# Patient Record
Sex: FEMALE | Race: ASIAN | Employment: FULL TIME | URBAN - METROPOLITAN AREA
[De-identification: names, ages, dates, MRNs, and addresses within clinical notes are randomized per-mention and may not be internally consistent; named-entity substitution may affect disease eponyms.]

---

## 2017-01-29 PROBLEM — M25.561 RIGHT KNEE PAIN, UNSPECIFIED CHRONICITY: Status: ACTIVE | Noted: 2017-01-29

## 2017-01-29 PROBLEM — M77.11 EPICONDYLITIS, LATERAL, RIGHT: Status: ACTIVE | Noted: 2017-01-29

## 2017-02-10 ENCOUNTER — HOSPITAL ENCOUNTER (EMERGENCY)
Age: 57
Discharge: HOME | End: 2017-02-10
Attending: EMERGENCY MEDICINE
Payer: COMMERCIAL

## 2017-02-10 ENCOUNTER — APPOINTMENT (OUTPATIENT)
Dept: GENERAL RADIOLOGY | Age: 57
End: 2017-02-10
Attending: EMERGENCY MEDICINE
Payer: COMMERCIAL

## 2017-02-10 VITALS
HEART RATE: 109 BPM | RESPIRATION RATE: 20 BRPM | WEIGHT: 135 LBS | BODY MASS INDEX: 25.49 KG/M2 | HEIGHT: 61 IN | SYSTOLIC BLOOD PRESSURE: 138 MMHG | DIASTOLIC BLOOD PRESSURE: 80 MMHG | OXYGEN SATURATION: 100 % | TEMPERATURE: 101 F

## 2017-02-10 DIAGNOSIS — J06.9 VIRAL URI WITH COUGH: Primary | ICD-10-CM

## 2017-02-10 LAB
ALBUMIN SERPL-MCNC: 3.8 G/DL (ref 3.5–4.8)
ALP LIVER SERPL-CCNC: 77 U/L (ref 46–118)
ALT SERPL-CCNC: 37 U/L (ref 14–54)
AST SERPL-CCNC: 24 U/L (ref 15–41)
BASOPHILS # BLD AUTO: 0.03 X10(3) UL (ref 0–0.1)
BASOPHILS NFR BLD AUTO: 0.4 %
BILIRUB SERPL-MCNC: 0.5 MG/DL (ref 0.1–2)
BUN BLD-MCNC: 11 MG/DL (ref 8–20)
CALCIUM BLD-MCNC: 8.5 MG/DL (ref 8.3–10.3)
CHLORIDE: 101 MMOL/L (ref 101–111)
CO2: 26 MMOL/L (ref 22–32)
CREAT BLD-MCNC: 0.59 MG/DL (ref 0.55–1.02)
EOSINOPHIL # BLD AUTO: 0 X10(3) UL (ref 0–0.3)
EOSINOPHIL NFR BLD AUTO: 0 %
ERYTHROCYTE [DISTWIDTH] IN BLOOD BY AUTOMATED COUNT: 15 % (ref 11.5–16)
GLUCOSE BLD-MCNC: 109 MG/DL (ref 70–99)
HCT VFR BLD AUTO: 34.5 % (ref 34–50)
HGB BLD-MCNC: 11.5 G/DL (ref 12–16)
IMMATURE GRANULOCYTE COUNT: 0.03 X10(3) UL (ref 0–1)
IMMATURE GRANULOCYTE RATIO %: 0.4 %
LYMPHOCYTES # BLD AUTO: 0.63 X10(3) UL (ref 0.9–4)
LYMPHOCYTES NFR BLD AUTO: 8.5 %
M PROTEIN MFR SERPL ELPH: 7.5 G/DL (ref 6.1–8.3)
MCH RBC QN AUTO: 27.4 PG (ref 27–33.2)
MCHC RBC AUTO-ENTMCNC: 33.3 G/DL (ref 31–37)
MCV RBC AUTO: 82.1 FL (ref 81–100)
MONOCYTES # BLD AUTO: 0.53 X10(3) UL (ref 0.1–0.6)
MONOCYTES NFR BLD AUTO: 7.2 %
NEUTROPHIL ABS PRELIM: 6.19 X10 (3) UL (ref 1.3–6.7)
NEUTROPHILS # BLD AUTO: 6.19 X10(3) UL (ref 1.3–6.7)
NEUTROPHILS NFR BLD AUTO: 83.5 %
PLATELET # BLD AUTO: 252 10(3)UL (ref 150–450)
POTASSIUM SERPL-SCNC: 3.8 MMOL/L (ref 3.6–5.1)
RBC # BLD AUTO: 4.2 X10(6)UL (ref 3.8–5.1)
RED CELL DISTRIBUTION WIDTH-SD: 45.2 FL (ref 35.1–46.3)
SODIUM SERPL-SCNC: 138 MMOL/L (ref 136–144)
WBC # BLD AUTO: 7.4 X10(3) UL (ref 4–13)

## 2017-02-10 PROCEDURE — 99284 EMERGENCY DEPT VISIT MOD MDM: CPT

## 2017-02-10 PROCEDURE — 99283 EMERGENCY DEPT VISIT LOW MDM: CPT

## 2017-02-10 PROCEDURE — 80053 COMPREHEN METABOLIC PANEL: CPT | Performed by: EMERGENCY MEDICINE

## 2017-02-10 PROCEDURE — 71020 XR CHEST PA + LAT CHEST (CPT=71020): CPT

## 2017-02-10 PROCEDURE — 36415 COLL VENOUS BLD VENIPUNCTURE: CPT

## 2017-02-10 PROCEDURE — 85025 COMPLETE CBC W/AUTO DIFF WBC: CPT | Performed by: EMERGENCY MEDICINE

## 2017-02-10 RX ORDER — ACETAMINOPHEN 500 MG
1000 TABLET ORAL ONCE
Status: COMPLETED | OUTPATIENT
Start: 2017-02-10 | End: 2017-02-10

## 2017-02-10 RX ORDER — SODIUM CHLORIDE 9 MG/ML
INJECTION, SOLUTION INTRAVENOUS ONCE
Status: COMPLETED | OUTPATIENT
Start: 2017-02-10 | End: 2017-02-10

## 2017-02-10 NOTE — ED PROVIDER NOTES
Patient Seen in: [facility] Emergency Department In [place]    History   Patient presents with:  Cough/URI  Fever Sepsis (infectious)    Stated Complaint: COUGH    HPI    Patient is a 59-year-old female who presents emergency room with a history of cough daily. Fluticasone-Salmeterol (ADVAIR DISKUS IN),  Inhale 1-2 puffs into the lungs as needed. Albuterol Sulfate HFA (PROAIR HFA) 108 (90 BASE) MCG/ACT Inhalation Aero Soln,  Inhale 1 puff into the lungs every 6 (six) hours as needed for Wheezing. There is normoactive bowel sounds. There is no hernia. EXTREMITIES: There is no cyanosis, clubbing, or edema appreciated. Pulses are 2+ and equal in all 4 extremities. NEURO: Patient is awake, alert and oriented to time place and person.  Motor strength i home as well as Motrin and Tylenol for any fever at home. Instructions to have the patient return to the ER immediately if symptoms worsen or if any other problems arise. Patient discharged home at this time.         Disposition and Plan     Clinical Impr

## 2017-02-20 ENCOUNTER — HOSPITAL ENCOUNTER (OUTPATIENT)
Dept: GENERAL RADIOLOGY | Facility: HOSPITAL | Age: 57
Discharge: HOME | End: 2017-02-20
Attending: INTERNAL MEDICINE
Payer: COMMERCIAL

## 2017-02-20 DIAGNOSIS — J40 BRONCHITIS: ICD-10-CM

## 2017-02-20 PROCEDURE — 71020 XR CHEST PA + LAT CHEST (CPT=71020): CPT

## 2017-03-18 PROBLEM — M71.21 BAKER'S CYST, RIGHT: Status: ACTIVE | Noted: 2017-03-18

## 2017-06-12 PROBLEM — M25.571 RIGHT ANKLE PAIN, UNSPECIFIED CHRONICITY: Status: ACTIVE | Noted: 2017-06-12

## 2017-06-12 PROBLEM — M19.171 POST-TRAUMATIC ARTHRITIS OF RIGHT ANKLE: Status: ACTIVE | Noted: 2017-06-12

## 2017-11-15 PROBLEM — M19.071 PRIMARY OSTEOARTHRITIS OF RIGHT ANKLE: Status: ACTIVE | Noted: 2017-11-15

## 2017-11-29 ENCOUNTER — TELEPHONE (OUTPATIENT)
Dept: OBGYN CLINIC | Facility: CLINIC | Age: 57
End: 2017-11-29

## 2017-11-29 NOTE — TELEPHONE ENCOUNTER
Received from [address] results in Dr Gilbert bustamante in [place]     Pt has upcoming Annual with Dr Julianna Canseco in [place] on 12/8

## 2017-12-05 ENCOUNTER — MED REC SCAN ONLY (OUTPATIENT)
Dept: OBGYN CLINIC | Facility: CLINIC | Age: 57
End: 2017-12-05

## 2017-12-29 ENCOUNTER — PRIOR ORIGINAL RECORDS (OUTPATIENT)
Dept: OTHER | Age: 57
End: 2017-12-29

## 2018-01-11 ENCOUNTER — APPOINTMENT (OUTPATIENT)
Dept: GENERAL RADIOLOGY | Facility: HOSPITAL | Age: 58
End: 2018-01-11
Attending: EMERGENCY MEDICINE
Payer: COMMERCIAL

## 2018-01-11 ENCOUNTER — HOSPITAL ENCOUNTER (EMERGENCY)
Facility: HOSPITAL | Age: 58
Discharge: HOME | End: 2018-01-11
Attending: EMERGENCY MEDICINE
Payer: COMMERCIAL

## 2018-01-11 VITALS
RESPIRATION RATE: 16 BRPM | SYSTOLIC BLOOD PRESSURE: 157 MMHG | HEART RATE: 91 BPM | DIASTOLIC BLOOD PRESSURE: 87 MMHG | TEMPERATURE: 98 F | OXYGEN SATURATION: 100 %

## 2018-01-11 DIAGNOSIS — S16.1XXA STRAIN OF NECK MUSCLE, INITIAL ENCOUNTER: Primary | ICD-10-CM

## 2018-01-11 DIAGNOSIS — S39.012A STRAIN OF LUMBAR REGION, INITIAL ENCOUNTER: ICD-10-CM

## 2018-01-11 DIAGNOSIS — S40.011A CONTUSION OF RIGHT SHOULDER, INITIAL ENCOUNTER: ICD-10-CM

## 2018-01-11 PROCEDURE — 72040 X-RAY EXAM NECK SPINE 2-3 VW: CPT | Performed by: EMERGENCY MEDICINE

## 2018-01-11 PROCEDURE — 71045 X-RAY EXAM CHEST 1 VIEW: CPT | Performed by: EMERGENCY MEDICINE

## 2018-01-11 PROCEDURE — 99284 EMERGENCY DEPT VISIT MOD MDM: CPT

## 2018-01-11 PROCEDURE — 96374 THER/PROPH/DIAG INJ IV PUSH: CPT

## 2018-01-11 PROCEDURE — 73020 X-RAY EXAM OF SHOULDER: CPT | Performed by: EMERGENCY MEDICINE

## 2018-01-11 PROCEDURE — 72100 X-RAY EXAM L-S SPINE 2/3 VWS: CPT | Performed by: EMERGENCY MEDICINE

## 2018-01-11 RX ORDER — CYCLOBENZAPRINE HCL 10 MG
10 TABLET ORAL 3 TIMES DAILY PRN
Qty: 20 TABLET | Refills: 0 | Status: SHIPPED | OUTPATIENT
Start: 2018-01-11 | End: 2018-01-18

## 2018-01-11 RX ORDER — IBUPROFEN 600 MG/1
600 TABLET ORAL EVERY 8 HOURS PRN
Qty: 30 TABLET | Refills: 0 | Status: SHIPPED | OUTPATIENT
Start: 2018-01-11 | End: 2018-01-18

## 2018-01-11 RX ORDER — KETOROLAC TROMETHAMINE 30 MG/ML
30 INJECTION, SOLUTION INTRAMUSCULAR; INTRAVENOUS ONCE
Status: COMPLETED | OUTPATIENT
Start: 2018-01-11 | End: 2018-01-11
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
## 2018-04-18 ENCOUNTER — PRIOR ORIGINAL RECORDS (OUTPATIENT)
Dept: OTHER | Age: 58
End: 2018-04-18

## 2018-04-18 LAB
ALBUMIN: 4 G/DL
ALKALINE PHOSPHATATE(ALK PHOS): 70 IU/L
BILIRUBIN TOTAL: 0.4 MG/DL
BUN: 15 MG/DL
CALCIUM: 8.7 MG/DL
CHLORIDE: 105 MEQ/L
CHOLESTEROL, TOTAL: 244 MG/DL
CREATININE, SERUM: 0.47 MG/DL
GLUCOSE: 75 MG/DL
HDL CHOLESTEROL: 85 MG/DL
HEMATOCRIT: 35.8 %
HEMOGLOBIN A1C: 5.7 %
HEMOGLOBIN: 12 G/DL
LDL CHOLESTEROL: 137 MG/DL
PLATELETS: 287 K/UL
POTASSIUM, SERUM: 4.3 MEQ/L
PROTEIN, TOTAL: 6.7 G/DL
RED BLOOD COUNT: 4.44 X 10-6/U
SGOT (AST): 24 IU/L
SGPT (ALT): 18 IU/L
SODIUM: 142 MEQ/L
TRIGLYCERIDES: 109 MG/DL
WHITE BLOOD COUNT: 5.3 X 10-3/U

## 2018-05-04 ENCOUNTER — PRIOR ORIGINAL RECORDS (OUTPATIENT)
Dept: OTHER | Age: 58
End: 2018-05-04

## 2018-06-05 ENCOUNTER — HOSPITAL ENCOUNTER (EMERGENCY)
Age: 58
Discharge: HOME | End: 2018-06-05
Attending: EMERGENCY MEDICINE
Payer: COMMERCIAL

## 2018-06-05 ENCOUNTER — APPOINTMENT (OUTPATIENT)
Dept: GENERAL RADIOLOGY | Age: 58
End: 2018-06-05
Attending: EMERGENCY MEDICINE
Payer: COMMERCIAL

## 2018-06-05 VITALS
DIASTOLIC BLOOD PRESSURE: 76 MMHG | SYSTOLIC BLOOD PRESSURE: 134 MMHG | OXYGEN SATURATION: 99 % | TEMPERATURE: 97 F | HEART RATE: 71 BPM | WEIGHT: 155 LBS | BODY MASS INDEX: 29 KG/M2 | RESPIRATION RATE: 16 BRPM

## 2018-06-05 DIAGNOSIS — S39.012A BACK STRAIN, INITIAL ENCOUNTER: Primary | ICD-10-CM

## 2018-06-05 PROCEDURE — 72220 X-RAY EXAM SACRUM TAILBONE: CPT | Performed by: EMERGENCY MEDICINE

## 2018-06-05 PROCEDURE — 99284 EMERGENCY DEPT VISIT MOD MDM: CPT

## 2018-06-05 PROCEDURE — 72110 X-RAY EXAM L-2 SPINE 4/>VWS: CPT | Performed by: EMERGENCY MEDICINE

## 2018-06-06 NOTE — ED PROVIDER NOTES
Patient Seen in: [facility] Emergency Department In [place]    History   Patient presents with:  Fall (musculoskeletal, neurologic)    Stated Complaint: pain to right buttock after a fall from a step ladder.      HPI    60-year-old female who presents margarita [06/05/18 2143]  BP: 141/72  Pulse: 81  Resp: 16  Temp: 97.1 °F (36.2 °C)  Temp src: Oral  SpO2: 99 %  O2 Device: None (Room air)    Current:/76   Pulse 71   Temp 97.1 °F (36.2 °C) (Oral)   Resp 16   Wt 70.3 kg   SpO2 99%   BMI 29.29 kg/m²         Ph develop.             Disposition and Plan     Clinical Impression:  Back strain, initial encounter  (primary encounter diagnosis)    Disposition:  Discharge  6/5/2018 11:11 pm    Follow-up:  Shweta Hyman MD  [address] [place] [address]

## 2018-06-19 ENCOUNTER — HOSPITAL ENCOUNTER (OUTPATIENT)
Dept: CV DIAGNOSTICS | Facility: HOSPITAL | Age: 58
Discharge: HOME | End: 2018-06-19
Attending: INTERNAL MEDICINE
Payer: COMMERCIAL

## 2018-06-19 DIAGNOSIS — E78.00 PURE HYPERCHOLESTEROLEMIA: ICD-10-CM

## 2018-06-19 DIAGNOSIS — Z82.49 FAM HX-ISCHEM HEART DISEASE: ICD-10-CM

## 2018-06-19 DIAGNOSIS — R07.9 CHEST PAIN: ICD-10-CM

## 2018-06-19 DIAGNOSIS — I10 ESSENTIAL HYPERTENSION, MALIGNANT: ICD-10-CM

## 2018-06-19 PROCEDURE — 78452 HT MUSCLE IMAGE SPECT MULT: CPT | Performed by: INTERNAL MEDICINE

## 2018-06-19 PROCEDURE — 93017 CV STRESS TEST TRACING ONLY: CPT | Performed by: INTERNAL MEDICINE

## 2018-06-19 PROCEDURE — 93018 CV STRESS TEST I&R ONLY: CPT | Performed by: INTERNAL MEDICINE

## 2018-06-26 PROBLEM — G57.61 MORTON NEUROMA, RIGHT: Status: ACTIVE | Noted: 2018-06-26

## 2018-07-02 ENCOUNTER — PRIOR ORIGINAL RECORDS (OUTPATIENT)
Dept: OTHER | Age: 58
End: 2018-07-02

## 2018-08-28 ENCOUNTER — PRIOR ORIGINAL RECORDS (OUTPATIENT)
Dept: OTHER | Age: 58
End: 2018-08-28

## 2018-08-28 ENCOUNTER — LAB ENCOUNTER (OUTPATIENT)
Dept: LAB | Age: 58
End: 2018-08-28
Attending: INTERNAL MEDICINE
Payer: COMMERCIAL

## 2018-08-28 DIAGNOSIS — E78.00 HYPERCHOLESTEROLEMIA: ICD-10-CM

## 2018-08-28 DIAGNOSIS — E55.9 VITAMIN D DEFICIENCY: ICD-10-CM

## 2018-08-28 DIAGNOSIS — I10 HTN (HYPERTENSION): Primary | ICD-10-CM

## 2018-08-28 LAB
ALBUMIN SERPL-MCNC: 3.7 G/DL (ref 3.5–4.8)
ALBUMIN/GLOB SERPL: 1 {RATIO} (ref 1–2)
ALP LIVER SERPL-CCNC: 80 U/L (ref 46–118)
ALT SERPL-CCNC: 17 U/L (ref 14–54)
ANION GAP SERPL CALC-SCNC: 10 MMOL/L (ref 0–18)
AST SERPL-CCNC: 15 U/L (ref 15–41)
BILIRUB SERPL-MCNC: 0.4 MG/DL (ref 0.1–2)
BUN BLD-MCNC: 15 MG/DL (ref 8–20)
BUN/CREAT SERPL: 27.8 (ref 10–20)
CALCIUM BLD-MCNC: 9.1 MG/DL (ref 8.3–10.3)
CHLORIDE SERPL-SCNC: 105 MMOL/L (ref 101–111)
CO2 SERPL-SCNC: 26 MMOL/L (ref 22–32)
CREAT BLD-MCNC: 0.54 MG/DL (ref 0.55–1.02)
EST. AVERAGE GLUCOSE BLD GHB EST-MCNC: 114 MG/DL (ref 68–126)
GLOBULIN PLAS-MCNC: 3.7 G/DL (ref 2.5–4)
GLUCOSE BLD-MCNC: 80 MG/DL (ref 70–99)
HBA1C MFR BLD HPLC: 5.6 % (ref ?–5.7)
HGB BLD-MCNC: 11.9 G/DL (ref 12–16)
M PROTEIN MFR SERPL ELPH: 7.4 G/DL (ref 6.1–8.3)
OSMOLALITY SERPL CALC.SUM OF ELEC: 292 MOSM/KG (ref 275–295)
POTASSIUM SERPL-SCNC: 3.8 MMOL/L (ref 3.6–5.1)
SODIUM SERPL-SCNC: 141 MMOL/L (ref 136–144)
TSI SER-ACNC: 0.93 MIU/ML (ref 0.35–5.5)
VIT D+METAB SERPL-MCNC: 21.2 NG/ML (ref 30–100)

## 2018-08-28 PROCEDURE — 84443 ASSAY THYROID STIM HORMONE: CPT

## 2018-08-28 PROCEDURE — 82306 VITAMIN D 25 HYDROXY: CPT

## 2018-08-28 PROCEDURE — 80061 LIPID PANEL: CPT

## 2018-08-28 PROCEDURE — 36415 COLL VENOUS BLD VENIPUNCTURE: CPT

## 2018-08-28 PROCEDURE — 83036 HEMOGLOBIN GLYCOSYLATED A1C: CPT

## 2018-08-28 PROCEDURE — 86003 ALLG SPEC IGE CRUDE XTRC EA: CPT

## 2018-08-28 PROCEDURE — 80053 COMPREHEN METABOLIC PANEL: CPT

## 2018-08-28 PROCEDURE — 85018 HEMOGLOBIN: CPT

## 2018-08-29 LAB
ALLERGEN,  SHRIMP IGE: <0.1 KU/L
ALLERGEN, CLAM IGE: <0.1 KU/L
ALLERGEN, CODFISH: <0.1 KU/L
ALLERGEN, CORN IGE: <0.1 KU/L
ALLERGEN, EGG WHITE IGE: <0.1 KU/L
ALLERGEN, MILK (COW) IGE: <0.1 KU/L
ALLERGEN, PEANUT IGE: <0.1 KU/L
ALLERGEN, SCALLOP IGE: <0.1 KU/L
ALLERGEN, SOYBEAN IGE: <0.1 KU/L
ALLERGEN, WALNUT/BLACK WALNUT: <0.1 KU/L
ALLERGEN, WHEAT IGE: <0.1 KU/L
IMMUNOGLOBULIN E: 30 KU/L

## 2018-08-30 LAB
CHOLEST SMN-MCNC: 205 MG/DL (ref ?–200)
HDLC SERPL-MCNC: 89 MG/DL (ref 40–59)
LDLC SERPL CALC-MCNC: 101 MG/DL (ref ?–100)
NONHDLC SERPL-MCNC: 116 MG/DL (ref ?–130)
TRIGL SERPL-MCNC: 77 MG/DL (ref 30–149)
VLDLC SERPL CALC-MCNC: 15 MG/DL (ref 0–30)

## 2018-09-12 ENCOUNTER — MYAURORA ACCOUNT LINK (OUTPATIENT)
Dept: OTHER | Age: 58
End: 2018-09-12

## 2018-09-12 ENCOUNTER — PRIOR ORIGINAL RECORDS (OUTPATIENT)
Dept: OTHER | Age: 58
End: 2018-09-12

## 2018-09-19 LAB
ALBUMIN: 3.7 G/DL
ALKALINE PHOSPHATATE(ALK PHOS): 80 IU/L
BILIRUBIN TOTAL: 0.4 MG/DL
BUN: 15 MG/DL
CALCIUM: 9.1 MG/DL
CHLORIDE: 105 MEQ/L
CHOLESTEROL, TOTAL: 205 MG/DL
CREATININE, SERUM: 0.54 MG/DL
GLOBULIN: 3.7 G/DL
GLUCOSE: 80 MG/DL
HDL CHOLESTEROL: 89 MG/DL
HEMOGLOBIN A1C: 5.6 %
HEMOGLOBIN: 11.9 G/DL
LDL CHOLESTEROL: 101 MG/DL
POTASSIUM, SERUM: 3.8 MEQ/L
PROTEIN, TOTAL: 7.4 G/DL
SGOT (AST): 15 IU/L
SGPT (ALT): 17 IU/L
SODIUM: 141 MEQ/L
THYROID STIMULATING HORMONE: 0.93 MLU/L
TRIGLYCERIDES: 77 MG/DL
VITAMIN D 25-OH: 21.2 NG/ML

## 2019-02-18 ENCOUNTER — PRIOR ORIGINAL RECORDS (OUTPATIENT)
Dept: OTHER | Age: 59
End: 2019-02-18

## 2019-02-28 VITALS
HEIGHT: 62 IN | WEIGHT: 127 LBS | HEART RATE: 70 BPM | DIASTOLIC BLOOD PRESSURE: 84 MMHG | BODY MASS INDEX: 23.37 KG/M2 | SYSTOLIC BLOOD PRESSURE: 118 MMHG

## 2019-02-28 VITALS
DIASTOLIC BLOOD PRESSURE: 88 MMHG | HEIGHT: 62 IN | BODY MASS INDEX: 23.92 KG/M2 | WEIGHT: 130 LBS | HEART RATE: 76 BPM | SYSTOLIC BLOOD PRESSURE: 132 MMHG

## 2019-04-02 RX ORDER — LOSARTAN POTASSIUM 50 MG/1
TABLET ORAL
COMMUNITY
Start: 2018-09-12

## 2019-04-02 RX ORDER — ATORVASTATIN CALCIUM 10 MG/1
TABLET, FILM COATED ORAL
COMMUNITY
Start: 2018-09-12

## 2019-05-17 ENCOUNTER — LAB ENCOUNTER (OUTPATIENT)
Dept: LAB | Age: 59
End: 2019-05-17
Attending: INTERNAL MEDICINE
Payer: COMMERCIAL

## 2019-05-17 DIAGNOSIS — Z01.818 PRE-OP EXAM: Primary | ICD-10-CM

## 2019-05-17 PROCEDURE — 87081 CULTURE SCREEN ONLY: CPT

## 2019-05-17 NOTE — H&P
[facility]    PATIENT'S NAME: Yunior Gonzales   ATTENDING PHYSICIAN: Tad Bustillos M.D.    PATIENT ACCOUNT#:   [de-identified]    LOCATION:  OR   [facility]  MEDICAL RECORD #:   [id number]       YOB: 1960  ADMISSION DATE:       05/24/2019    HISTO of her knee, medial epicondylitis of right elbow, genu valgum deformities of both knees, bilateral knee pain, contact dermatitis, lumbar spondylosis, right-sided back pain without sciatica, contusion of left knee, arthritis both knees, benign paroxysmal po neuroma, right forefoot, between the third and fourth metatarsal heads. PLAN:  Excision of Muñoz neuroma and right total ankle replacement using a transfibular osteotomy at [facility] on 05/24/2019. The patient understands the procedure.   The p

## 2019-05-22 ENCOUNTER — ANESTHESIA EVENT (OUTPATIENT)
Dept: SURGERY | Facility: HOSPITAL | Age: 59
End: 2019-05-22

## 2019-05-24 ENCOUNTER — ANESTHESIA (OUTPATIENT)
Dept: SURGERY | Facility: HOSPITAL | Age: 59
End: 2019-05-24

## 2019-05-24 ENCOUNTER — APPOINTMENT (OUTPATIENT)
Dept: GENERAL RADIOLOGY | Facility: HOSPITAL | Age: 59
DRG: 469 | End: 2019-05-24
Attending: ORTHOPAEDIC SURGERY
Payer: COMMERCIAL

## 2019-05-24 ENCOUNTER — HOSPITAL ENCOUNTER (INPATIENT)
Facility: HOSPITAL | Age: 59
LOS: 2 days | Discharge: HOME | DRG: 469 | End: 2019-05-26
Attending: ORTHOPAEDIC SURGERY | Admitting: ORTHOPAEDIC SURGERY
Payer: COMMERCIAL

## 2019-05-24 DIAGNOSIS — M19.071 PRIMARY OSTEOARTHRITIS OF RIGHT ANKLE: ICD-10-CM

## 2019-05-24 DIAGNOSIS — G57.61 MORTON'S NEUROMA OF RIGHT FOOT: ICD-10-CM

## 2019-05-24 PROCEDURE — 0SRF0J9 REPLACEMENT OF RIGHT ANKLE JOINT WITH SYNTHETIC SUBSTITUTE, CEMENTED, OPEN APPROACH: ICD-10-PCS | Performed by: ORTHOPAEDIC SURGERY

## 2019-05-24 PROCEDURE — 76000 FLUOROSCOPY <1 HR PHYS/QHP: CPT | Performed by: ORTHOPAEDIC SURGERY

## 2019-05-24 PROCEDURE — 3E0T3BZ INTRODUCTION OF ANESTHETIC AGENT INTO PERIPHERAL NERVES AND PLEXI, PERCUTANEOUS APPROACH: ICD-10-PCS | Performed by: ANESTHESIOLOGY

## 2019-05-24 PROCEDURE — 01BG0ZZ EXCISION OF TIBIAL NERVE, OPEN APPROACH: ICD-10-PCS | Performed by: ORTHOPAEDIC SURGERY

## 2019-05-24 PROCEDURE — 88304 TISSUE EXAM BY PATHOLOGIST: CPT | Performed by: ORTHOPAEDIC SURGERY

## 2019-05-24 PROCEDURE — 76942 ECHO GUIDE FOR BIOPSY: CPT | Performed by: ORTHOPAEDIC SURGERY

## 2019-05-24 DEVICE — IMPLANTABLE DEVICE: Type: IMPLANTABLE DEVICE | Site: ANKLE | Status: FUNCTIONAL

## 2019-05-24 DEVICE — SCREW BN 2.7MM 14MM SS ELB: Type: IMPLANTABLE DEVICE | Site: ANKLE | Status: FUNCTIONAL

## 2019-05-24 DEVICE — SCREW CORT 3.5X16 2348-16-35: Type: IMPLANTABLE DEVICE | Site: ANKLE | Status: FUNCTIONAL

## 2019-05-24 DEVICE — CEMENT BONE ZIM PALACOS LVG: Type: IMPLANTABLE DEVICE | Site: ANKLE | Status: FUNCTIONAL

## 2019-05-24 RX ORDER — ACETAMINOPHEN 325 MG/1
TABLET ORAL
Status: COMPLETED
Start: 2019-05-24 | End: 2019-05-24

## 2019-05-24 RX ORDER — POLYETHYLENE GLYCOL 3350 17 G/17G
17 POWDER, FOR SOLUTION ORAL DAILY PRN
Status: DISCONTINUED | OUTPATIENT
Start: 2019-05-24 | End: 2019-05-26

## 2019-05-24 RX ORDER — CEFAZOLIN SODIUM/WATER 2 G/20 ML
2 SYRINGE (ML) INTRAVENOUS ONCE
Status: COMPLETED | OUTPATIENT
Start: 2019-05-24 | End: 2019-05-24

## 2019-05-24 RX ORDER — ONDANSETRON 2 MG/ML
4 INJECTION INTRAMUSCULAR; INTRAVENOUS EVERY 6 HOURS PRN
Status: DISCONTINUED | OUTPATIENT
Start: 2019-05-24 | End: 2019-05-26

## 2019-05-24 RX ORDER — HYDROMORPHONE HYDROCHLORIDE 1 MG/ML
0.2 INJECTION, SOLUTION INTRAMUSCULAR; INTRAVENOUS; SUBCUTANEOUS EVERY 2 HOUR PRN
Status: DISCONTINUED | OUTPATIENT
Start: 2019-05-24 | End: 2019-05-25

## 2019-05-24 RX ORDER — HYDROMORPHONE HYDROCHLORIDE 1 MG/ML
0.4 INJECTION, SOLUTION INTRAMUSCULAR; INTRAVENOUS; SUBCUTANEOUS EVERY 5 MIN PRN
Status: DISCONTINUED | OUTPATIENT
Start: 2019-05-24 | End: 2019-05-24 | Stop reason: HOSPADM

## 2019-05-24 RX ORDER — ZOLPIDEM TARTRATE 5 MG/1
5 TABLET ORAL NIGHTLY PRN
Status: DISCONTINUED | OUTPATIENT
Start: 2019-05-24 | End: 2019-05-26

## 2019-05-24 RX ORDER — ALBUTEROL SULFATE 90 UG/1
1 AEROSOL, METERED RESPIRATORY (INHALATION) EVERY 6 HOURS PRN
Status: DISCONTINUED | OUTPATIENT
Start: 2019-05-24 | End: 2019-05-26

## 2019-05-24 RX ORDER — CLINDAMYCIN PHOSPHATE 900 MG/50ML
900 INJECTION INTRAVENOUS EVERY 8 HOURS
Status: COMPLETED | OUTPATIENT
Start: 2019-05-24 | End: 2019-05-25

## 2019-05-24 RX ORDER — OXYCODONE HYDROCHLORIDE 10 MG/1
10 TABLET ORAL EVERY 4 HOURS PRN
Status: DISCONTINUED | OUTPATIENT
Start: 2019-05-24 | End: 2019-05-25

## 2019-05-24 RX ORDER — OXYCODONE HYDROCHLORIDE 15 MG/1
15 TABLET ORAL EVERY 4 HOURS PRN
Status: DISCONTINUED | OUTPATIENT
Start: 2019-05-24 | End: 2019-05-25

## 2019-05-24 RX ORDER — OXYCODONE HYDROCHLORIDE AND ACETAMINOPHEN 5; 325 MG/1; MG/1
1 TABLET ORAL EVERY 4 HOURS PRN
Qty: 40 TABLET | Refills: 1 | Status: SHIPPED | OUTPATIENT
Start: 2019-05-24 | End: 2019-06-03

## 2019-05-24 RX ORDER — SODIUM CHLORIDE, SODIUM LACTATE, POTASSIUM CHLORIDE, CALCIUM CHLORIDE 600; 310; 30; 20 MG/100ML; MG/100ML; MG/100ML; MG/100ML
INJECTION, SOLUTION INTRAVENOUS CONTINUOUS
Status: DISCONTINUED | OUTPATIENT
Start: 2019-05-24 | End: 2019-05-26

## 2019-05-24 RX ORDER — SODIUM CHLORIDE, SODIUM LACTATE, POTASSIUM CHLORIDE, CALCIUM CHLORIDE 600; 310; 30; 20 MG/100ML; MG/100ML; MG/100ML; MG/100ML
INJECTION, SOLUTION INTRAVENOUS CONTINUOUS
Status: DISCONTINUED | OUTPATIENT
Start: 2019-05-24 | End: 2019-05-24 | Stop reason: HOSPADM

## 2019-05-24 RX ORDER — TIZANIDINE 4 MG/1
4 TABLET ORAL 3 TIMES DAILY PRN
Status: DISCONTINUED | OUTPATIENT
Start: 2019-05-24 | End: 2019-05-25

## 2019-05-24 RX ORDER — DOCUSATE SODIUM 100 MG/1
100 CAPSULE, LIQUID FILLED ORAL 2 TIMES DAILY
Status: DISCONTINUED | OUTPATIENT
Start: 2019-05-24 | End: 2019-05-26

## 2019-05-24 RX ORDER — KETOROLAC TROMETHAMINE 30 MG/ML
30 INJECTION, SOLUTION INTRAMUSCULAR; INTRAVENOUS EVERY 6 HOURS
Status: COMPLETED | OUTPATIENT
Start: 2019-05-24 | End: 2019-05-25

## 2019-05-24 RX ORDER — HYDROMORPHONE HYDROCHLORIDE 1 MG/ML
0.4 INJECTION, SOLUTION INTRAMUSCULAR; INTRAVENOUS; SUBCUTANEOUS EVERY 2 HOUR PRN
Status: DISCONTINUED | OUTPATIENT
Start: 2019-05-24 | End: 2019-05-25

## 2019-05-24 RX ORDER — ACETAMINOPHEN 500 MG
1000 TABLET ORAL ONCE
Status: DISCONTINUED | OUTPATIENT
Start: 2019-05-24 | End: 2019-05-24 | Stop reason: HOSPADM

## 2019-05-24 RX ORDER — ACETAMINOPHEN 325 MG/1
650 TABLET ORAL 4 TIMES DAILY
Status: DISCONTINUED | OUTPATIENT
Start: 2019-05-25 | End: 2019-05-25

## 2019-05-24 RX ORDER — NALOXONE HYDROCHLORIDE 0.4 MG/ML
80 INJECTION, SOLUTION INTRAMUSCULAR; INTRAVENOUS; SUBCUTANEOUS AS NEEDED
Status: DISCONTINUED | OUTPATIENT
Start: 2019-05-24 | End: 2019-05-24 | Stop reason: HOSPADM

## 2019-05-24 RX ORDER — TRAMADOL HYDROCHLORIDE 50 MG/1
50 TABLET ORAL EVERY 6 HOURS
Status: DISPENSED | OUTPATIENT
Start: 2019-05-24 | End: 2019-05-26

## 2019-05-24 RX ORDER — BISACODYL 10 MG
10 SUPPOSITORY, RECTAL RECTAL
Status: DISCONTINUED | OUTPATIENT
Start: 2019-05-24 | End: 2019-05-26

## 2019-05-24 RX ORDER — KETOROLAC TROMETHAMINE 30 MG/ML
30 INJECTION, SOLUTION INTRAMUSCULAR; INTRAVENOUS EVERY 6 HOURS PRN
Status: DISCONTINUED | OUTPATIENT
Start: 2019-05-24 | End: 2019-05-24 | Stop reason: HOSPADM

## 2019-05-24 RX ORDER — ONDANSETRON 2 MG/ML
4 INJECTION INTRAMUSCULAR; INTRAVENOUS AS NEEDED
Status: DISCONTINUED | OUTPATIENT
Start: 2019-05-24 | End: 2019-05-24 | Stop reason: HOSPADM

## 2019-05-24 RX ORDER — SCOLOPAMINE TRANSDERMAL SYSTEM 1 MG/1
PATCH, EXTENDED RELEASE TRANSDERMAL
Status: DISCONTINUED
Start: 2019-05-24 | End: 2019-05-26

## 2019-05-24 RX ORDER — ATORVASTATIN CALCIUM 10 MG/1
10 TABLET, FILM COATED ORAL NIGHTLY
Status: DISCONTINUED | OUTPATIENT
Start: 2019-05-24 | End: 2019-05-26

## 2019-05-24 RX ORDER — CEFAZOLIN SODIUM/WATER 2 G/20 ML
SYRINGE (ML) INTRAVENOUS
Status: DISPENSED
Start: 2019-05-24 | End: 2019-05-25

## 2019-05-24 RX ORDER — OXYCODONE HYDROCHLORIDE 5 MG/1
5 TABLET ORAL EVERY 4 HOURS PRN
Status: DISCONTINUED | OUTPATIENT
Start: 2019-05-24 | End: 2019-05-25

## 2019-05-24 RX ORDER — ASPIRIN 325 MG
325 TABLET ORAL 2 TIMES DAILY
Status: DISCONTINUED | OUTPATIENT
Start: 2019-05-25 | End: 2019-05-26

## 2019-05-24 RX ORDER — PANTOPRAZOLE SODIUM 40 MG/1
40 TABLET, DELAYED RELEASE ORAL
Status: DISCONTINUED | OUTPATIENT
Start: 2019-05-25 | End: 2019-05-26

## 2019-05-24 RX ORDER — LOSARTAN POTASSIUM 50 MG/1
50 TABLET ORAL DAILY
Status: DISCONTINUED | OUTPATIENT
Start: 2019-05-25 | End: 2019-05-26

## 2019-05-24 RX ORDER — ACETAMINOPHEN 500 MG
1000 TABLET ORAL ONCE
Status: ON HOLD | COMMUNITY
End: 2019-05-24

## 2019-05-24 RX ORDER — SCOLOPAMINE TRANSDERMAL SYSTEM 1 MG/1
1 PATCH, EXTENDED RELEASE TRANSDERMAL ONCE
Status: DISCONTINUED | OUTPATIENT
Start: 2019-05-24 | End: 2019-05-26

## 2019-05-24 RX ORDER — MORPHINE SULFATE 4 MG/ML
2 INJECTION, SOLUTION INTRAMUSCULAR; INTRAVENOUS EVERY 5 MIN PRN
Status: DISCONTINUED | OUTPATIENT
Start: 2019-05-24 | End: 2019-05-24 | Stop reason: HOSPADM

## 2019-05-24 RX ORDER — ACETAMINOPHEN 325 MG/1
650 TABLET ORAL ONCE
Status: COMPLETED | OUTPATIENT
Start: 2019-05-24 | End: 2019-05-24

## 2019-05-24 RX ORDER — HYDROMORPHONE HYDROCHLORIDE 1 MG/ML
0.8 INJECTION, SOLUTION INTRAMUSCULAR; INTRAVENOUS; SUBCUTANEOUS EVERY 2 HOUR PRN
Status: DISCONTINUED | OUTPATIENT
Start: 2019-05-24 | End: 2019-05-25

## 2019-05-24 NOTE — ANESTHESIA PREPROCEDURE EVALUATION
PRE-OP EVALUATION    Patient Name: Shawnee Aguilar    Pre-op Diagnosis: Primary osteoarthritis of right ankle [M19.071]  Nogueira's neuroma of right foot [G57.61]    Procedure(s):  RIGHT TOTAL ANKLE ARTHROPLASTY AND RESECTION NOGUEIRA'S NEUROMA RIGHT FOOT    Gamble Rfl:        Allergies: Latex; Vicodin [Hydrocodone-Acetaminophen]; Adhesive Tape; Amoxicillin; Betadine [Povidone Iodine]      Anesthesia Evaluation    Patient summary reviewed.     Anesthetic Complications  (-) history of anesthetic complications         G by Lauren Dhillon MD at [facility] MAIN OR   • KNEE REPLACEMENT SURGERY Bilateral 1/2015    TKA   • KNEE REPLACEMENT SURGERY     • KNEE TOTAL BILATERAL REPLACEMENT Bilateral 1/30/2015    Performed by Lauren Dhillon MD at [facility] MAIN OR   • OTHER SURGICAL HISTORY

## 2019-05-25 PROCEDURE — 85025 COMPLETE CBC W/AUTO DIFF WBC: CPT | Performed by: PHYSICIAN ASSISTANT

## 2019-05-25 PROCEDURE — 97530 THERAPEUTIC ACTIVITIES: CPT

## 2019-05-25 PROCEDURE — 80048 BASIC METABOLIC PNL TOTAL CA: CPT | Performed by: INTERNAL MEDICINE

## 2019-05-25 PROCEDURE — 97535 SELF CARE MNGMENT TRAINING: CPT

## 2019-05-25 PROCEDURE — 97161 PT EVAL LOW COMPLEX 20 MIN: CPT

## 2019-05-25 PROCEDURE — 97166 OT EVAL MOD COMPLEX 45 MIN: CPT

## 2019-05-25 RX ORDER — HYDROMORPHONE HYDROCHLORIDE 1 MG/ML
0.2 INJECTION, SOLUTION INTRAMUSCULAR; INTRAVENOUS; SUBCUTANEOUS EVERY 2 HOUR PRN
Status: DISCONTINUED | OUTPATIENT
Start: 2019-05-25 | End: 2019-05-26

## 2019-05-25 RX ORDER — TIZANIDINE 4 MG/1
4 TABLET ORAL EVERY 6 HOURS PRN
Status: DISCONTINUED | OUTPATIENT
Start: 2019-05-25 | End: 2019-05-26

## 2019-05-25 RX ORDER — ACETAMINOPHEN 325 MG/1
325 TABLET ORAL EVERY 4 HOURS PRN
Status: DISCONTINUED | OUTPATIENT
Start: 2019-05-25 | End: 2019-05-26

## 2019-05-25 RX ORDER — HYDROMORPHONE HYDROCHLORIDE 1 MG/ML
0.5 INJECTION, SOLUTION INTRAMUSCULAR; INTRAVENOUS; SUBCUTANEOUS EVERY 2 HOUR PRN
Status: DISCONTINUED | OUTPATIENT
Start: 2019-05-25 | End: 2019-05-26

## 2019-05-25 RX ORDER — ACETAMINOPHEN 325 MG/1
650 TABLET ORAL EVERY 4 HOURS PRN
Status: DISCONTINUED | OUTPATIENT
Start: 2019-05-25 | End: 2019-05-26

## 2019-05-25 RX ORDER — HYDROCODONE BITARTRATE AND ACETAMINOPHEN 10; 325 MG/1; MG/1
1 TABLET ORAL EVERY 4 HOURS PRN
Status: DISCONTINUED | OUTPATIENT
Start: 2019-05-25 | End: 2019-05-26

## 2019-05-25 RX ORDER — HYDROMORPHONE HYDROCHLORIDE 1 MG/ML
0.4 INJECTION, SOLUTION INTRAMUSCULAR; INTRAVENOUS; SUBCUTANEOUS EVERY 2 HOUR PRN
Status: DISCONTINUED | OUTPATIENT
Start: 2019-05-25 | End: 2019-05-26

## 2019-05-25 RX ORDER — TIZANIDINE 2 MG/1
2 TABLET ORAL EVERY 6 HOURS PRN
Status: DISCONTINUED | OUTPATIENT
Start: 2019-05-25 | End: 2019-05-26

## 2019-05-25 RX ORDER — HYDROCODONE BITARTRATE AND ACETAMINOPHEN 10; 325 MG/1; MG/1
2 TABLET ORAL EVERY 4 HOURS PRN
Status: DISCONTINUED | OUTPATIENT
Start: 2019-05-25 | End: 2019-05-26

## 2019-05-25 NOTE — CM/SW NOTE
MSW received order for dc planning. The patient is a 61year old female who lives with her spouse and admitted for ankle fracture. PT is recommending home. No identified or anticipated dc planning needs at this time.     Sushma Garcia LCSW

## 2019-05-25 NOTE — PROGRESS NOTES
NURSING ADMISSION NOTE      Patient received from PACU via bed  Oriented to room. Safety precautions initiated. Bed in low position. Call light in reach. Denied any pain. Still numb and unable to wiggle toes yet. Spouse at bedside.

## 2019-05-25 NOTE — PLAN OF CARE
A&Ox4. Denies any pain so far. Continues to have numbness. Ace wrap is intact. Had small sanguineous drainage visible through ace wrap, by the fifth toe area. It started showing when pt dangled feet to use BSC.  Reinforced with gauze and another small ace w

## 2019-05-25 NOTE — PROGRESS NOTES
Post Op Day 1 Ortho Note    Status Post Nerve Block:  Type of Nerve Block: Right popliteal and adductor canal  Single Injection Nerve Block    Post op review: No evidence of immediate block related complications, No paresthesia noted, Able to lift leg(s) a

## 2019-05-25 NOTE — PHYSICAL THERAPY NOTE
PHYSICAL THERAPY EVALUATION - INPATIENT     Room Number: 376/376-A  Evaluation Date: 5/25/2019  Type of Evaluation: Initial  Physician Order: PT Eval and Treat    Presenting Problem: R total ankle arthroplasty and resection nuñez's neuroma 5/24/19 (Comment)(WC)  Patient Regularly Uses: None    Prior Level of [place]: Pt lives at home with  who is retired. Patient was independent with ADLs, ambulation and stairs. Patient works full-time as nurse in [employer]      SUBJECTIVE  \"yeah I know Standardized Score (AM-PAC Scale): 47.67   CMS Modifier (G-Code): CJ    FUNCTIONAL ABILITY STATUS  Gait Assessment   Gait Assistance: Dependent assistance(score based on FIM, actual SBA)  Distance (ft): 5'  Assistive Device: Rolling walker  Pattern: (NWB fusion and resection nuñez's neuroma. Pertinent comorbidities and personal factors impacting therapy include hx B TKR, asthma, hx BPPV, spondylosis lumbar, HTN.   In this PT evaluation, the patient presents with the following impairments: decreased endura

## 2019-05-25 NOTE — PROGRESS NOTES
[facility]    Brittni Barrios Patient Status:  Inpatient    1960 MRN [id number]   [facility] [place] 3SW-A Attending Yvon Amin MD   Hosp Day # 1 PCP Abe Hernandez MD         S:  Patient doing well. No nausea.   No SOB, CP or calf

## 2019-05-25 NOTE — PLAN OF CARE
Pt in bed. RA, VSS. Numbness to right ankle below to knee to toes. Unable to wiggle. PT/OT to see. NWB RLE, post mold in place. Some dried, sanguinous drainage to splint. PA ortho aware. ASA BID for DVT prophylaxis. Will plan to d/c home tm.  Will continue

## 2019-05-25 NOTE — OCCUPATIONAL THERAPY NOTE
OCCUPATIONAL THERAPY QUICK EVALUATION - INPATIENT    Room Number: 376/376-A  Evaluation Date: 5/25/2019     Type of Evaluation: Initial  Presenting Problem: R total ankle arthroplasty 5/24/19    Physician Order: IP Consult to Occupational Therapy  Reason f Equipment: None    Occupation/Status: works full time as RN  Hand Dominance: (Ambidextrous)  Drives: Yes  Patient Regularly Uses: None    Prior Level of Function: Pt was ind in all ADL/IADL w/o AD AE.     SUBJECTIVE   \"I am the bionic woman\"    Patient se functional mobility (sup with RW, keeping NWB, discussion on limiting distance to protect R shoulder, pt states she has a w/c at home), toileting (simulated, sup, had grab bars and high toilet), grooming (discussion on completing tasks seated at sink), LB Analysis of occupational profile, detailed assessments, several treatment options    Overall Complexity  MODERATE     OT Discharge Recommendations: Home; Intermittent Supervision  OT Device Recommendations: None    PLAN   Patient has been evaluated and pres

## 2019-05-25 NOTE — ANESTHESIA POSTPROCEDURE EVALUATION
[facility] Clarita Charter Patient Status:  Inpatient   Age/Gender 61year old female MRN [id number]   Location [address] [place] [address] 3SW-A Attending Ruby Barboza MD   Hosp Day # 0 PCP Karissa Godinez MD       Anesthesia Post-op Note    Procedure(s):

## 2019-05-26 VITALS
WEIGHT: 130.94 LBS | BODY MASS INDEX: 24.72 KG/M2 | HEART RATE: 86 BPM | SYSTOLIC BLOOD PRESSURE: 130 MMHG | TEMPERATURE: 99 F | HEIGHT: 61 IN | DIASTOLIC BLOOD PRESSURE: 71 MMHG | RESPIRATION RATE: 18 BRPM | OXYGEN SATURATION: 94 %

## 2019-05-26 PROCEDURE — 85025 COMPLETE CBC W/AUTO DIFF WBC: CPT | Performed by: PHYSICIAN ASSISTANT

## 2019-05-26 PROCEDURE — 97116 GAIT TRAINING THERAPY: CPT

## 2019-05-26 PROCEDURE — 82962 GLUCOSE BLOOD TEST: CPT

## 2019-05-26 PROCEDURE — 97530 THERAPEUTIC ACTIVITIES: CPT

## 2019-05-26 RX ORDER — ASPIRIN 325 MG
325 TABLET ORAL 2 TIMES DAILY
Qty: 60 TABLET | Refills: 0 | Status: SHIPPED | OUTPATIENT
Start: 2019-05-26 | End: 2020-08-04

## 2019-05-26 RX ORDER — OXYCODONE HYDROCHLORIDE AND ACETAMINOPHEN 5; 325 MG/1; MG/1
1 TABLET ORAL EVERY 4 HOURS PRN
Status: DISCONTINUED | OUTPATIENT
Start: 2019-05-26 | End: 2019-05-26

## 2019-05-26 RX ORDER — PSEUDOEPHEDRINE HCL 30 MG
100 TABLET ORAL 2 TIMES DAILY PRN
Qty: 30 CAPSULE | Refills: 0 | Status: SHIPPED | OUTPATIENT
Start: 2019-05-26

## 2019-05-26 NOTE — PROGRESS NOTES
[facility] Hospitalist Progress Note     PCP: Salo Sullivan MD    CC:  Follow up    SUBJECTIVE:  Pt laying in bed, elevating R ankle. No cp/sob/n/v/f/c. +U, +flatus.  Pain controlled    OBJECTIVE:  Temp:  [97.7 °F (36.5 °C)-98.5 °F (36.9 °C)] 98.5 °F (36 HFA       Assessment/Plan:       61year old female with mmp including but not limited to asthma, BPPV, HTN/HL, OA who is consulted for medical management s/p R total ankle arthroplasty.      **Expected pain  -IV dilaudid prn, oxy prn-encourage transition

## 2019-05-26 NOTE — BRIEF OP NOTE
Pre-Operative Diagnosis: Primary osteoarthritis of right ankle [M19.071]  Muñoz's neuroma of right foot [G57.61]     Post-Operative Diagnosis: same as pre-op      Procedure Performed:   Procedure(s):  RIGHT TOTAL ANKLE ARTHROPLASTY AND RESECTION MUÑOZ'S

## 2019-05-26 NOTE — PROGRESS NOTES
05/26/19 0906   Clinical Encounter Type   Visited With Health care provider   Referral From Nurse   Referral To Other (Comment)  ([facility] )   Adventism Encounters   Spiritual Requests During Visit / Hospitalization Spiritism Communion

## 2019-05-26 NOTE — PHYSICAL THERAPY NOTE
PHYSICAL THERAPY TREATMENT NOTE - INPATIENT    Room Number: 376/376-A     Session:1  Number of Visits to Meet Established Goals: 1    Presenting Problem: R total ankle arthroplasty and resection nuñez's neuroma 5/24/19     History related to current admi RLE  Management Techniques: Activity promotion; Body mechanics;Breathing techniques;Relaxation;Repositioning    BALANCE                                                                                                                     Static Sitting: Good positioning.,activity. Pt is an RN. Patient End of Session: In bed;Needs met;Call light within reach;RN aware of session/findings; All patient questions and concerns addressed;SCDs in place    ASSESSMENT   Patient is a 61year old female admitted on

## 2019-05-26 NOTE — PROGRESS NOTES
[facility]    Sonny Hinds Patient Status:  Inpatient    1960 MRN [id number]   [facility] 3SW-A Attending Cesar Otero MD   Hosp Day # 2 PCP Marlee Chaidez MD         S:  Patient doing well. No nausea.   No SOB, CP or calf

## 2019-05-26 NOTE — PLAN OF CARE
Received in bed resting. Discussed plan of care. Right ankle in post mold with Ace bandage. Old drainage noted on dressing. Able to wiggle toes. NWB to right leg. Medicated for pain with scheduled oral pain medication. Seems pain is well controlled.    Aske mobility/gait  Description  Interventions:  - Assess patient's functional ability and stability  - Promote increasing activity/tolerance for mobility and gait  - Educate and engage patient/family in tolerated activity level and precautions  -stair train wi

## 2019-05-26 NOTE — PROGRESS NOTES
OK TO D/C PER DR BOONE AND DR Erich Caraballo. ALL DISCHARGE INSTRUCTIONS DISCUSSED WITH PATIENT AND SPOUSE MELY AT BEDSIDE. IV D/C'D. SITE INTACT. SCRIPTS SENT. ALL PAPERWORK SENT. ALL PERSONAL BELONGINGS SENT. TO HOME THIS AFTERNOON.

## 2019-05-26 NOTE — PLAN OF CARE
Plan of care discussed with patient and spouse this am. Plan for d/c home after P.T eval. Percocet for pain med ordered and to trial while here inpatient prior to d/c home. Constipation prevention discussed. Miralax given.  Post mold is dry, able to wiggle

## 2019-05-27 NOTE — DISCHARGE SUMMARY
Discharge Summary  Patient ID:  David Low  [id number]  61year old  4/24/1960    Admit date: 5/24/2019    Discharge date and time: 5/26/19    Attending Physician: No att. providers found     Reason for admission: Primary osteoarthritis of right ankle [

## 2019-05-28 NOTE — OPERATIVE REPORT
[address]    PATIENT'S NAME: Radha Dooley   ATTENDING PHYSICIAN: Tiffanie Osorio M.D. OPERATING PHYSICIAN: Tiffanie Osorio M.D.    PATIENT ACCOUNT#:   [de-identified]    LOCATION:  [address] [place] [address]  MEDICAL RECORD #:   [id number]       DATE OF BIRTH:  [birth year] it to the calcaneus and exposing the ankle mortise. I assembled the external fixator frame. I placed a calcaneal pin through the calcaneus and a Schanz into the talus. I placed 2 Schanz pins into the tibial shaft.   I was able to correct the varus deform metatarsal ligament was released. The common digital nerve was identified and removed. The wound was irrigated and closed with interrupted nylon suture. A short leg splint was applied, keeping the ankle in neutral position.   Tourniquet time was 166 lauren

## 2019-05-28 NOTE — PAYOR COMM NOTE
--------------  DISCHARGE REVIEW---REQUESTING ADDITIONAL DAY 5/25 WITH DISCHARGE ON 5/26      Payor: [facility] BECKA ROSARIO  Subscriber #:  [id number]  Authorization Number: [id number]    Admit date: 5/24/19  Admit time:  2057  Discharge Date: 5/26/2019  1:43 PM     Admi S:  Patient doing well. No nausea. No SOB, CP or calf pain. Pain controlled with medicine. No numbness or tingling. No other concerns. O:  Blood pressure 124/61, pulse 76, temperature 97.6 °F (36.4 °C), temperature source Oral, resp.  rate 20, height CV: Heart with regular rate and rhythm, no peripheral edema  Abd: Abdomen soft, nontender, nondistended, bowel sounds present, no overt hernias  MSK:   no clubbing, no cyanosis  Skin: no visible rashes    Psych:   appropriate affect,   memory intact     Da

## 2019-06-13 PROBLEM — Z96.662: Status: ACTIVE | Noted: 2019-06-13

## 2019-07-24 ENCOUNTER — PATIENT OUTREACH (OUTPATIENT)
Dept: INFECTIOUS DISEASE | Facility: CLINIC | Age: 59
End: 2019-07-24

## 2019-07-24 NOTE — PROGRESS NOTES
INFECTIOUS DISEASE CONSULTATION    Jeanine Schultz    IVETH 1960 MRN [id number]   [facility]       PCP Ezequiel Fagan MD     Requested by Dr. Nnamdi Bryan    Reason for Consultation:  Right ankle wound post op infection She has never used smokeless tobacco. She reports that she does not drink alcohol or use drugs.     Allergies:    Latex                   RASH    Comment:Tiburcio  Vicodin [Hydrocodon*    RASH, ITCHING  Adhesive Tape           RASH    Comment:Paper tape and te auscultation bilaterally. No wheezes. No rhonchi. Cardiovascular: S1, S2.  Regular rate and rhythm. No murmurs. Abdomen: Soft, nontender, nondistended. Positive bowel sounds.   Musculoskeletal: right ankle wound, serous discharge, soft, weeping      Pr Edilberto Hagen  [phone]

## 2019-07-31 ENCOUNTER — APPOINTMENT (OUTPATIENT)
Dept: CARDIOLOGY | Age: 59
End: 2019-07-31

## 2019-10-23 ENCOUNTER — APPOINTMENT (OUTPATIENT)
Dept: CARDIOLOGY | Age: 59
End: 2019-10-23

## 2019-12-09 ENCOUNTER — OFFICE VISIT (OUTPATIENT)
Dept: NEUROLOGY | Facility: CLINIC | Age: 59
End: 2019-12-09
Payer: COMMERCIAL

## 2019-12-09 VITALS
HEART RATE: 78 BPM | BODY MASS INDEX: 22 KG/M2 | SYSTOLIC BLOOD PRESSURE: 102 MMHG | DIASTOLIC BLOOD PRESSURE: 62 MMHG | WEIGHT: 119 LBS | RESPIRATION RATE: 16 BRPM

## 2019-12-09 DIAGNOSIS — G62.9 NEUROPATHY: Primary | ICD-10-CM

## 2019-12-09 PROCEDURE — 99204 OFFICE O/P NEW MOD 45 MIN: CPT | Performed by: OTHER

## 2019-12-09 NOTE — H&P
Neurology H&P    Darien Mello Patient Status:  No patient class for patient encounter    1960 MRN [id number]   Location [facility], [address] [place] [address] Drive, [facility] Attending No att. providers found   Hosp Day # 0 PCP Haider Cuellar MD Pantoprazole Sodium 40 MG Oral Tab EC as needed. • Losartan Potassium 50 MG Oral Tab Take 50 mg by mouth daily. • Atorvastatin Calcium 10 MG Oral Tab Take 10 mg by mouth nightly. • TURMERIC OR Take by mouth daily.      • Fluticasone-Salmeterol intolerance (pre-diabetes)     during pregnancy   • H/O mammogram 10/31/2012    negative   • High cholesterol    • HYPERTENSION    • Osteoarthrosis, unspecified whether generalized or localized, unspecified site    • Pap smear for cervical cancer screening sensation intact, strong eye closure, face is symmetric, no dysarthria, tongue midline,  no tongue fasciculations or atrophy, strong shoulder shrug.     MOTOR EXAMINATION: normal tone, no fasciculations, normal strength throughout in UEs and LEs     SENSORY

## 2019-12-12 ENCOUNTER — APPOINTMENT (OUTPATIENT)
Dept: LAB | Age: 59
End: 2019-12-12
Attending: Other
Payer: COMMERCIAL

## 2019-12-12 DIAGNOSIS — G62.9 NEUROPATHY: ICD-10-CM

## 2019-12-12 PROCEDURE — 82607 VITAMIN B-12: CPT | Performed by: OTHER

## 2019-12-12 PROCEDURE — 36415 COLL VENOUS BLD VENIPUNCTURE: CPT | Performed by: OTHER

## 2019-12-12 PROCEDURE — 84446 ASSAY OF VITAMIN E: CPT | Performed by: OTHER

## 2019-12-12 PROCEDURE — 84207 ASSAY OF VITAMIN B-6: CPT | Performed by: OTHER

## 2019-12-17 ENCOUNTER — TELEPHONE (OUTPATIENT)
Dept: NEUROLOGY | Facility: CLINIC | Age: 59
End: 2019-12-17

## 2019-12-17 NOTE — TELEPHONE ENCOUNTER
Spoke with pt and she just wanted to give an update that the gabapentin was working well for her. She also wanted to know her lab results. Upon epic review Dr Edel Baig noted that her vitamin levels were normal so I informed the pt of this.      Jo Stein   Message  Received:  Today       MD Arturo Preciado LPN       Caller: Unspecified (Yesterday,  9:50 AM)                     Patient will need Synthroid.  She was on 125 mcg at one time if not on it it will be restarted.  If on it will need to be increased to 150 mcg.   She will also need low cholesterol diet.            Previous Messages

## 2020-03-16 ENCOUNTER — TELEPHONE (OUTPATIENT)
Dept: NEUROLOGY | Facility: CLINIC | Age: 60
End: 2020-03-16

## 2020-04-06 ENCOUNTER — TELEPHONE (OUTPATIENT)
Dept: NEUROLOGY | Facility: CLINIC | Age: 60
End: 2020-04-06

## 2020-04-06 NOTE — TELEPHONE ENCOUNTER
Pt calling to report that she was not able to come to or call about her 3/16/20 appt with Dr. King Salgado because she had gone to the [facility] and was stranded over there because of COVID-19, and had no way to connect with the [place] She called to r/s appt.

## 2020-06-27 ENCOUNTER — HOSPITAL ENCOUNTER (OUTPATIENT)
Dept: ULTRASOUND IMAGING | Age: 60
Discharge: HOME | End: 2020-06-27
Attending: INTERNAL MEDICINE
Payer: COMMERCIAL

## 2020-06-27 ENCOUNTER — HOSPITAL ENCOUNTER (OUTPATIENT)
Dept: GENERAL RADIOLOGY | Age: 60
Discharge: HOME | End: 2020-06-27
Attending: INTERNAL MEDICINE
Payer: COMMERCIAL

## 2020-06-27 ENCOUNTER — HOSPITAL ENCOUNTER (EMERGENCY)
Facility: HOSPITAL | Age: 60
Discharge: HOME | End: 2020-06-28
Attending: EMERGENCY MEDICINE
Payer: COMMERCIAL

## 2020-06-27 DIAGNOSIS — M79.661 PAIN OF RIGHT LOWER LEG: ICD-10-CM

## 2020-06-27 DIAGNOSIS — M79.604 RIGHT LEG PAIN: ICD-10-CM

## 2020-06-27 DIAGNOSIS — I82.401 ACUTE DEEP VEIN THROMBOSIS (DVT) OF RIGHT LOWER EXTREMITY, UNSPECIFIED VEIN (HCC): Primary | ICD-10-CM

## 2020-06-27 DIAGNOSIS — M25.571 RIGHT ANKLE PAIN: ICD-10-CM

## 2020-06-27 PROCEDURE — 99284 EMERGENCY DEPT VISIT MOD MDM: CPT

## 2020-06-27 PROCEDURE — 36415 COLL VENOUS BLD VENIPUNCTURE: CPT

## 2020-06-27 PROCEDURE — 85730 THROMBOPLASTIN TIME PARTIAL: CPT | Performed by: EMERGENCY MEDICINE

## 2020-06-27 PROCEDURE — 93971 EXTREMITY STUDY: CPT | Performed by: INTERNAL MEDICINE

## 2020-06-27 PROCEDURE — 73610 X-RAY EXAM OF ANKLE: CPT | Performed by: INTERNAL MEDICINE

## 2020-06-27 PROCEDURE — 99285 EMERGENCY DEPT VISIT HI MDM: CPT

## 2020-06-27 PROCEDURE — 99283 EMERGENCY DEPT VISIT LOW MDM: CPT

## 2020-06-27 PROCEDURE — 80053 COMPREHEN METABOLIC PANEL: CPT | Performed by: EMERGENCY MEDICINE

## 2020-06-27 PROCEDURE — 85025 COMPLETE CBC W/AUTO DIFF WBC: CPT | Performed by: EMERGENCY MEDICINE

## 2020-06-27 PROCEDURE — 85610 PROTHROMBIN TIME: CPT | Performed by: EMERGENCY MEDICINE

## 2020-06-28 VITALS
TEMPERATURE: 99 F | OXYGEN SATURATION: 100 % | SYSTOLIC BLOOD PRESSURE: 138 MMHG | BODY MASS INDEX: 21.35 KG/M2 | HEART RATE: 84 BPM | RESPIRATION RATE: 18 BRPM | WEIGHT: 116 LBS | HEIGHT: 62 IN | DIASTOLIC BLOOD PRESSURE: 88 MMHG

## 2020-06-28 NOTE — ED PROVIDER NOTES
Patient Seen in: [facility] Emergency Department      History   Patient presents with:  Deep Vein Thrombosis    Stated Complaint: + RT leg DVT per US done tonlety BAXTER    Can James is a pleasant 10year-old female presenting with DVT.   Patient had an o Review of Systems    Positive for stated complaint: + RT leg DVT per US done tonight  Other systems are as noted in HPI. Constitutional and vital signs reviewed. All other systems reviewed and negative except as noted above.     Physical Exam (*)     RDW-SD 48.7 (*)     All other components within normal limits   PROTHROMBIN TIME (PT) - Normal   CBC WITH DIFFERENTIAL WITH PLATELET    Narrative: The following orders were created for panel order CBC WITH DIFFERENTIAL WITH PLATELET.   Procedure

## 2020-06-30 ENCOUNTER — TELEPHONE (OUTPATIENT)
Dept: HEMATOLOGY/ONCOLOGY | Facility: HOSPITAL | Age: 60
End: 2020-06-30

## 2020-07-10 ENCOUNTER — TELEPHONE (OUTPATIENT)
Dept: CARDIOLOGY | Age: 60
End: 2020-07-10

## 2020-07-15 ENCOUNTER — APPOINTMENT (OUTPATIENT)
Dept: HEMATOLOGY/ONCOLOGY | Facility: HOSPITAL | Age: 60
End: 2020-07-15
Attending: INTERNAL MEDICINE
Payer: COMMERCIAL

## 2020-07-28 ENCOUNTER — APPOINTMENT (OUTPATIENT)
Dept: LAB | Age: 60
End: 2020-07-28
Attending: INTERNAL MEDICINE
Payer: COMMERCIAL

## 2020-07-28 ENCOUNTER — OFFICE VISIT (OUTPATIENT)
Dept: NEUROLOGY | Facility: CLINIC | Age: 60
End: 2020-07-28
Payer: COMMERCIAL

## 2020-07-28 VITALS
RESPIRATION RATE: 16 BRPM | BODY MASS INDEX: 21 KG/M2 | WEIGHT: 116 LBS | DIASTOLIC BLOOD PRESSURE: 74 MMHG | SYSTOLIC BLOOD PRESSURE: 118 MMHG | HEART RATE: 76 BPM

## 2020-07-28 DIAGNOSIS — G62.9 NEUROPATHY: Primary | ICD-10-CM

## 2020-07-28 PROCEDURE — 99213 OFFICE O/P EST LOW 20 MIN: CPT | Performed by: OTHER

## 2020-07-28 PROCEDURE — 3074F SYST BP LT 130 MM HG: CPT | Performed by: OTHER

## 2020-07-28 PROCEDURE — 3078F DIAST BP <80 MM HG: CPT | Performed by: OTHER

## 2020-07-28 RX ORDER — GABAPENTIN 300 MG/1
300 CAPSULE ORAL 3 TIMES DAILY
Qty: 180 CAPSULE | Refills: 1 | Status: SHIPPED | OUTPATIENT
Start: 2020-07-28 | End: 2021-06-08

## 2020-07-28 NOTE — PROGRESS NOTES
Neurology H&P    Sonny Hinds Patient Status:  No patient class for patient encounter    1960 MRN [id number]   Location [address] [place] [address], [address] [place] [address], [address] [place] [address] Attending No att. providers found   Hosp Day # 0 PCP Marlee Chaidez MD Current Medications:  Current Outpatient Medications   Medication Sig Dispense Refill   • apixaban 5 MG Oral Tab Take 2 tabs (10mg) by mouth twice daily for 7 days, then take 1 tab (5mg) by mouth twice daily thereafter.  74 tablet 0   • aspirin 325 MG O medial, right     BPPV (benign paroxysmal positional vertigo)     Tinnitus     HTN (hypertension)     Chronic right shoulder pain     Biceps tendinitis, right     Asthma     Orthopedic aftercare     Impingement syndrome of right shoulder     Right lateral Used    Alcohol use: No    Drug use: No      Family History:  Family History   Problem Relation Age of Onset   • Diabetes Father    • Other (Cancer liver) Father        ROS:  10 point ROS completed and was negative, except for pertinent positive and negati polyneuropathy. Her exam is noted above. Vit B6, B12, and Vit E were WNL. She wears a RLE compression stocking for edema. She does not take gabapentin nightly as directed.  Since she stets that it does seem to help when she does take it I encouraged her to

## 2020-08-04 ENCOUNTER — OFFICE VISIT (OUTPATIENT)
Dept: HEMATOLOGY/ONCOLOGY | Facility: HOSPITAL | Age: 60
End: 2020-08-04
Attending: INTERNAL MEDICINE
Payer: COMMERCIAL

## 2020-08-04 VITALS
BODY MASS INDEX: 22.26 KG/M2 | WEIGHT: 121 LBS | DIASTOLIC BLOOD PRESSURE: 89 MMHG | OXYGEN SATURATION: 100 % | HEART RATE: 75 BPM | RESPIRATION RATE: 16 BRPM | TEMPERATURE: 98 F | SYSTOLIC BLOOD PRESSURE: 152 MMHG | HEIGHT: 62 IN

## 2020-08-04 DIAGNOSIS — I82.441 ACUTE DVT OF RIGHT TIBIAL VEIN (HCC): Primary | ICD-10-CM

## 2020-08-04 DIAGNOSIS — D64.9 ANEMIA, UNSPECIFIED TYPE: ICD-10-CM

## 2020-08-04 LAB
ALBUMIN SERPL-MCNC: 3.7 G/DL (ref 3.4–5)
ALBUMIN/GLOB SERPL: 1 {RATIO} (ref 1–2)
ALP LIVER SERPL-CCNC: 77 U/L (ref 46–118)
ALT SERPL-CCNC: 16 U/L (ref 13–56)
ANION GAP SERPL CALC-SCNC: 0 MMOL/L (ref 0–18)
AST SERPL-CCNC: 10 U/L (ref 15–37)
BASOPHILS # BLD AUTO: 0.06 X10(3) UL (ref 0–0.2)
BASOPHILS NFR BLD AUTO: 0.7 %
BILIRUB SERPL-MCNC: 0.5 MG/DL (ref 0.1–2)
BUN BLD-MCNC: 12 MG/DL (ref 7–18)
BUN/CREAT SERPL: 22.2 (ref 10–20)
CALCIUM BLD-MCNC: 8.6 MG/DL (ref 8.5–10.1)
CHLORIDE SERPL-SCNC: 104 MMOL/L (ref 98–112)
CO2 SERPL-SCNC: 31 MMOL/L (ref 21–32)
CREAT BLD-MCNC: 0.54 MG/DL (ref 0.55–1.02)
DEPRECATED HBV CORE AB SER IA-ACNC: 155.7 NG/ML (ref 18–340)
DEPRECATED RDW RBC AUTO: 46.7 FL (ref 35.1–46.3)
EOSINOPHIL # BLD AUTO: 0.39 X10(3) UL (ref 0–0.7)
EOSINOPHIL NFR BLD AUTO: 4.6 %
ERYTHROCYTE [DISTWIDTH] IN BLOOD BY AUTOMATED COUNT: 15.7 % (ref 11–15)
GLOBULIN PLAS-MCNC: 3.8 G/DL (ref 2.8–4.4)
GLUCOSE BLD-MCNC: 84 MG/DL (ref 70–99)
HCT VFR BLD AUTO: 33.6 % (ref 35–48)
HGB BLD-MCNC: 11 G/DL (ref 12–16)
HGB RETIC QN AUTO: 33.1 PG (ref 28.2–36.6)
IMM GRANULOCYTES # BLD AUTO: 0.02 X10(3) UL (ref 0–1)
IMM GRANULOCYTES NFR BLD: 0.2 %
IMM RETICS NFR: 0.04 RATIO (ref 0.1–0.3)
IRON SATURATION: 20 % (ref 15–50)
IRON SERPL-MCNC: 67 UG/DL (ref 50–170)
LYMPHOCYTES # BLD AUTO: 3.02 X10(3) UL (ref 1–4)
LYMPHOCYTES NFR BLD AUTO: 35.7 %
M PROTEIN MFR SERPL ELPH: 7.5 G/DL (ref 6.4–8.2)
MCH RBC QN AUTO: 26.7 PG (ref 26–34)
MCHC RBC AUTO-ENTMCNC: 32.7 G/DL (ref 31–37)
MCV RBC AUTO: 81.6 FL (ref 80–100)
MONOCYTES # BLD AUTO: 0.62 X10(3) UL (ref 0.1–1)
MONOCYTES NFR BLD AUTO: 7.3 %
NEUTROPHILS # BLD AUTO: 4.34 X10 (3) UL (ref 1.5–7.7)
NEUTROPHILS # BLD AUTO: 4.34 X10(3) UL (ref 1.5–7.7)
NEUTROPHILS NFR BLD AUTO: 51.5 %
OSMOLALITY SERPL CALC.SUM OF ELEC: 279 MOSM/KG (ref 275–295)
PLATELET # BLD AUTO: 344 10(3)UL (ref 150–450)
POTASSIUM SERPL-SCNC: 3.4 MMOL/L (ref 3.5–5.1)
RBC # BLD AUTO: 4.12 X10(6)UL (ref 3.8–5.3)
RETICS # AUTO: 40 X10(3) UL (ref 22.5–147.5)
RETICS/RBC NFR AUTO: 1 % (ref 0.5–2.5)
SODIUM SERPL-SCNC: 135 MMOL/L (ref 136–145)
TOTAL IRON BINDING CAPACITY: 332 UG/DL (ref 240–450)
TRANSFERRIN SERPL-MCNC: 223 MG/DL (ref 200–360)
VIT B12 SERPL-MCNC: 449 PG/ML (ref 193–986)
WBC # BLD AUTO: 8.5 X10(3) UL (ref 4–11)

## 2020-08-04 PROCEDURE — 99243 OFF/OP CNSLTJ NEW/EST LOW 30: CPT | Performed by: INTERNAL MEDICINE

## 2020-08-04 RX ORDER — TRIAMTERENE AND HYDROCHLOROTHIAZIDE 37.5; 25 MG/1; MG/1
CAPSULE ORAL
COMMUNITY
Start: 2020-07-08

## 2020-08-04 NOTE — PROGRESS NOTES
Pt got the blood clot after a lot of traveling. Also knee and ankle surgery about a year ago. No abnormal bleeding.    Education Record    Learner:  Patient    Disease / [facility]    Barriers / Limitations:  None   Comments:    Method:  Dis

## 2020-08-04 NOTE — CONSULTS
Cancer Center Report of Consultation    Patient Name: Quintin Caballero   YOB: 1960   Medical Record Number: [id number]   CSN: [id number]   Consulting Physician: Gaby Samson MD  Referring Physician(s): No ref.  provider found  Date of Consult Bilateral 1/2015    TKA   • KNEE REPLACEMENT SURGERY     • KNEE TOTAL BILATERAL REPLACEMENT Bilateral 1/30/2015    Performed by Marcus Santos MD at [facility] MAIN OR   • MRI ANKLE/HINDFOOT, RIGHT (CPT=73721) Right 05/2019   • OTHER SURGICAL HISTORY      ORIF ri Disp: , Rfl:   •  Fluticasone-Salmeterol (ADVAIR DISKUS IN), Inhale 1-2 puffs into the lungs as needed. , Disp: , Rfl:   •  Albuterol Sulfate HFA (PROAIR HFA) 108 (90 BASE) MCG/ACT Inhalation Aero Soln, Inhale 1 puff into the lungs every 6 (six) hours as ne Assessment and Plan:    # R  leg DVT: 61year old with below knee right leg DVT diagnosed 6/20. Appears to have been provoked by 18-hour nonstop drive from here to [place] to drop her daughter off.  2 days after that, she flew back to [place].   Previ

## 2020-08-05 ENCOUNTER — OFFICE VISIT (OUTPATIENT)
Dept: CARDIOLOGY | Age: 60
End: 2020-08-05

## 2020-08-05 VITALS
HEIGHT: 62 IN | DIASTOLIC BLOOD PRESSURE: 64 MMHG | BODY MASS INDEX: 22.26 KG/M2 | WEIGHT: 121 LBS | HEART RATE: 84 BPM | SYSTOLIC BLOOD PRESSURE: 114 MMHG

## 2020-08-05 DIAGNOSIS — I10 ESSENTIAL HYPERTENSION: Primary | ICD-10-CM

## 2020-08-05 DIAGNOSIS — E78.00 HYPERCHOLESTEROLEMIA: ICD-10-CM

## 2020-08-05 DIAGNOSIS — G47.30 SLEEP APNEA, UNSPECIFIED TYPE: ICD-10-CM

## 2020-08-05 PROCEDURE — 99214 OFFICE O/P EST MOD 30 MIN: CPT | Performed by: INTERNAL MEDICINE

## 2020-08-05 SDOH — HEALTH STABILITY: MENTAL HEALTH: HOW OFTEN DO YOU HAVE A DRINK CONTAINING ALCOHOL?: NEVER

## 2020-08-05 ASSESSMENT — ENCOUNTER SYMPTOMS
CHILLS: 0
COUGH: 0
HEMOPTYSIS: 0
WEIGHT GAIN: 0
FEVER: 0
SNORING: 1
SUSPICIOUS LESIONS: 0
BRUISES/BLEEDS EASILY: 0
WEIGHT LOSS: 0
HEMATOCHEZIA: 0
ALLERGIC/IMMUNOLOGIC COMMENTS: NO NEW FOOD ALLERGIES

## 2020-08-05 ASSESSMENT — PATIENT HEALTH QUESTIONNAIRE - PHQ9
CLINICAL INTERPRETATION OF PHQ2 SCORE: NO FURTHER SCREENING NEEDED
SUM OF ALL RESPONSES TO PHQ9 QUESTIONS 1 AND 2: 0
SUM OF ALL RESPONSES TO PHQ9 QUESTIONS 1 AND 2: 0
1. LITTLE INTEREST OR PLEASURE IN DOING THINGS: NOT AT ALL
2. FEELING DOWN, DEPRESSED OR HOPELESS: NOT AT ALL
CLINICAL INTERPRETATION OF PHQ9 SCORE: NO FURTHER SCREENING NEEDED

## 2020-08-06 ENCOUNTER — TELEPHONE (OUTPATIENT)
Dept: CARDIOLOGY | Age: 60
End: 2020-08-06

## 2020-08-07 LAB
HEMOGLOBIN - OTHER: 0 %
HEMOGLOBIN A2: 3.3 %
HEMOGLOBIN A: 96.2 %
HEMOGLOBIN C: 0 %
HEMOGLOBIN E: 0 %
HEMOGLOBIN F: 0.5 %
HEMOGLOBIN S: 0 %

## 2020-08-20 ENCOUNTER — TELEPHONE (OUTPATIENT)
Dept: NEUROLOGY | Facility: CLINIC | Age: 60
End: 2020-08-20

## 2020-10-27 ENCOUNTER — TELEPHONE (OUTPATIENT)
Dept: HEMATOLOGY/ONCOLOGY | Facility: HOSPITAL | Age: 60
End: 2020-10-27

## 2020-10-27 NOTE — TELEPHONE ENCOUNTER
lvm for patient to review recommendations for repeat US, and then follow up with Dr Lubna Rojas. Advised to schedule the US, and follow up with Dr Susan Seymour. Asked her to call back with any questions.

## 2020-10-27 NOTE — TELEPHONE ENCOUNTER
Would like a copy of her blood tests from 8/2020 to be faxed to [phone] to her PCP. Also, does she need to make another appointment to see Dr. Rosa M He- she is still taking the blood thinners. Please call and advise.

## 2020-11-04 ENCOUNTER — OFFICE VISIT (OUTPATIENT)
Dept: NEUROLOGY | Facility: CLINIC | Age: 60
End: 2020-11-04
Payer: COMMERCIAL

## 2020-11-04 VITALS
SYSTOLIC BLOOD PRESSURE: 116 MMHG | DIASTOLIC BLOOD PRESSURE: 70 MMHG | RESPIRATION RATE: 12 BRPM | HEART RATE: 68 BPM | BODY MASS INDEX: 23 KG/M2 | WEIGHT: 126 LBS

## 2020-11-04 DIAGNOSIS — G62.9 NEUROPATHY: Primary | ICD-10-CM

## 2020-11-04 PROCEDURE — 99213 OFFICE O/P EST LOW 20 MIN: CPT | Performed by: OTHER

## 2020-11-04 PROCEDURE — 3074F SYST BP LT 130 MM HG: CPT | Performed by: OTHER

## 2020-11-04 PROCEDURE — 3078F DIAST BP <80 MM HG: CPT | Performed by: OTHER

## 2020-11-04 NOTE — PROGRESS NOTES
Neurology H&P    Fredis Reyes Patient Status:  No patient class for patient encounter    1960 MRN [id number]   Location [address] [place] [address], [address] [place] [address] Drive, [address] [place] [address] Attending No att. providers found   Hosp Day # 0 PCP Naga Baker MD Triamterene-HCTZ 37.5-25 MG Oral Cap      • apixaban 5 MG Oral Tab Take 1 tablet (5 mg total) by mouth 2 (two) times daily. 60 tablet 3   • gabapentin 300 MG Oral Cap Take 1 capsule (300 mg total) by mouth 3 (three) times daily.  180 capsule 1   • apixaban Fall, initial encounter     Epicondylitis elbow, medial, right     BPPV (benign paroxysmal positional vertigo)     Tinnitus     HTN (hypertension)     Chronic right shoulder pain     Biceps tendinitis, right     Asthma     Orthopedic aftercare     Impingem Never Smoker      Smokeless tobacco: Never Used    Alcohol use: No    Drug use: No      Family History:  Family History   Problem Relation Age of Onset   • Diabetes Father    • Other (Cancer liver) Father        ROS:  10 point ROS completed and was negativ polyneuropathy. Her neuropathy is really only bothersome in her R foot. She has had and ankle surgery and a mortons neuroma removed and she reports her RLE pains in her feet got worse at that time. She has edema in the RLE.  I will increase gabapentin to 30

## 2020-11-13 ENCOUNTER — HOSPITAL ENCOUNTER (OUTPATIENT)
Dept: ULTRASOUND IMAGING | Age: 60
Discharge: HOME | End: 2020-11-13
Attending: INTERNAL MEDICINE
Payer: COMMERCIAL

## 2020-11-13 DIAGNOSIS — I82.409 DVT (DEEP VENOUS THROMBOSIS) (HCC): ICD-10-CM

## 2020-11-13 DIAGNOSIS — Z86.718 HISTORY OF DEEP VENOUS THROMBOSIS (DVT) OF DISTAL VEIN OF RIGHT LOWER EXTREMITY: ICD-10-CM

## 2020-11-13 PROCEDURE — 93970 EXTREMITY STUDY: CPT | Performed by: INTERNAL MEDICINE

## 2020-11-17 ENCOUNTER — TELEPHONE (OUTPATIENT)
Dept: NEUROLOGY | Facility: CLINIC | Age: 60
End: 2020-11-17

## 2020-11-17 NOTE — TELEPHONE ENCOUNTER
KAMLATCB:    RN reviewed Epic and there was an EMG ordered on 11/4/2020. No record of EMG completed by [facility] staff or results received from outside source.     When phone call returned - If patient has completed outside of [facility], she will need to bring repo

## 2020-11-17 NOTE — TELEPHONE ENCOUNTER
I do not see that she has had any EMG done. I have not done an EMG for her. At this time there are no results to tell her about. I did call her number provided which went to an office that was closed and her cell phone.  I left a LALA

## 2020-12-07 ENCOUNTER — TELEPHONE (OUTPATIENT)
Dept: HEMATOLOGY/ONCOLOGY | Facility: HOSPITAL | Age: 60
End: 2020-12-07

## 2020-12-14 ENCOUNTER — TELEPHONE (OUTPATIENT)
Dept: HEMATOLOGY/ONCOLOGY | Facility: HOSPITAL | Age: 60
End: 2020-12-14

## 2020-12-14 NOTE — TELEPHONE ENCOUNTER
Patient called and stated she needed to cancel her appointment for f/u tomorrow, 12/16/20. She wanted to know if she should continue the blood thinner and she would like some blood work done before she sees the doctor again.  She wants to discuss which blo

## 2020-12-14 NOTE — TELEPHONE ENCOUNTER
Spoke with patient, and let her know I reviewed the chart, and Dr Jesus Manuel Medeiros had recommended if no DVT on recent US, can stop the anticoagulation, and take low dose ASA daily. Last US showed no DVT. Offered telephone visit, and pt will call if needed.  Pt enc

## 2020-12-16 ENCOUNTER — APPOINTMENT (OUTPATIENT)
Dept: HEMATOLOGY/ONCOLOGY | Facility: HOSPITAL | Age: 60
End: 2020-12-16
Attending: INTERNAL MEDICINE
Payer: COMMERCIAL

## 2021-01-22 ENCOUNTER — HOSPITAL ENCOUNTER (OUTPATIENT)
Dept: ULTRASOUND IMAGING | Facility: HOSPITAL | Age: 61
Discharge: HOME | End: 2021-01-22
Attending: INTERNAL MEDICINE
Payer: COMMERCIAL

## 2021-01-22 DIAGNOSIS — M25.561 RIGHT KNEE PAIN: ICD-10-CM

## 2021-01-22 DIAGNOSIS — Z86.718 HISTORY OF DVT (DEEP VEIN THROMBOSIS): ICD-10-CM

## 2021-01-22 PROCEDURE — 93971 EXTREMITY STUDY: CPT | Performed by: INTERNAL MEDICINE

## 2021-01-25 ENCOUNTER — TELEPHONE (OUTPATIENT)
Dept: HEMATOLOGY/ONCOLOGY | Facility: HOSPITAL | Age: 61
End: 2021-01-25

## 2021-01-25 NOTE — TELEPHONE ENCOUNTER
Spoke with patient. She had US on Friday, bc of severe leg pain. DVT found. Pt restarted the eliquis at 5mg BID. Reviewed with Dr Adia Urban, and advised her to take 10mg BID for 1 weeks, then 5mg BID.    Also advised to make a follow up with Dr Adia Urban

## 2021-01-25 NOTE — TELEPHONE ENCOUNTER
Mayur Puga calling about her ultrasound she has done on 1/22 she said they found another DVT. She can be reached on her work number.  Thank you Lavell Carry

## 2021-02-15 ENCOUNTER — APPOINTMENT (OUTPATIENT)
Dept: HEMATOLOGY/ONCOLOGY | Age: 61
End: 2021-02-15
Attending: INTERNAL MEDICINE
Payer: COMMERCIAL

## 2021-02-15 NOTE — PROGRESS NOTES
[facility] Progress Note      Patient Name:  Link Lat  YOB: 1960  Medical Record Number:  [id number]    Date of visit:  2/15/2021    CHIEF COMPLAINT: R DVT    HPI:     61year old female that I saw 8/20 with provoked R leg DV Cyclobenzaprine HCl 10 MG Oral Tab Take 10 mg by mouth 3 (three) times daily as needed for Muscle spasms. • Pantoprazole Sodium 40 MG Oral Tab EC as needed. • Losartan Potassium 50 MG Oral Tab Take 50 mg by mouth daily.      • Atorvastatin Calcium

## 2021-02-21 NOTE — PROGRESS NOTES
[facility] Progress Note      Patient Name:  Frank Dawkins  YOB: 1960  Medical Record Number:  [id number]    Date of visit: 2/22/2021    CHIEF COMPLAINT: R DVT    HPI:     61year old female that I saw 8/20 with provoked R leg DVT days prior to surgery with last dose morn of surgery 22 g 0   • Cyclobenzaprine HCl 10 MG Oral Tab Take 10 mg by mouth 3 (three) times daily as needed for Muscle spasms. • Pantoprazole Sodium 40 MG Oral Tab EC as needed.        • Losartan Potassium 50 M recurrent thromboses as well. Tolerating without any problems. Plans to travel to [facility] soon. PCP did refill. She will call when she runs out. ORDERS PLACED:    Return in about 6 months (around 8/22/2021) for MD visit.     Yun Weldon M.D.

## 2021-02-22 ENCOUNTER — OFFICE VISIT (OUTPATIENT)
Dept: HEMATOLOGY/ONCOLOGY | Age: 61
End: 2021-02-22
Attending: INTERNAL MEDICINE
Payer: COMMERCIAL

## 2021-02-22 VITALS
DIASTOLIC BLOOD PRESSURE: 83 MMHG | RESPIRATION RATE: 18 BRPM | HEART RATE: 81 BPM | SYSTOLIC BLOOD PRESSURE: 130 MMHG | BODY MASS INDEX: 24 KG/M2 | WEIGHT: 132 LBS | OXYGEN SATURATION: 99 % | TEMPERATURE: 98 F

## 2021-02-22 DIAGNOSIS — I82.441 ACUTE DVT OF RIGHT TIBIAL VEIN (HCC): Primary | ICD-10-CM

## 2021-02-22 PROCEDURE — 99214 OFFICE O/P EST MOD 30 MIN: CPT | Performed by: INTERNAL MEDICINE

## 2021-02-22 NOTE — PROGRESS NOTES
Routine MD follow up for DVT. Taking eliquis without a problem. Pt feeling well.    Education Record    Learner:  Patient/ spouse    Disease / Kristin Tanging of care    Barriers / Limitations:  None   Comments:    Method:  Discussion   Comments:    Freddy

## 2021-04-09 DIAGNOSIS — Z23 NEED FOR VACCINATION: ICD-10-CM

## 2021-05-05 ENCOUNTER — APPOINTMENT (OUTPATIENT)
Dept: CARDIOLOGY | Age: 61
End: 2021-05-05

## 2021-05-09 DIAGNOSIS — I82.441 ACUTE DVT OF RIGHT TIBIAL VEIN (HCC): Primary | ICD-10-CM

## 2021-05-10 RX ORDER — APIXABAN 5 MG/1
TABLET, FILM COATED ORAL
Qty: 60 TABLET | Refills: 3 | Status: SHIPPED | OUTPATIENT
Start: 2021-05-10 | End: 2021-09-01

## 2021-06-08 DIAGNOSIS — G62.9 NEUROPATHY: Primary | ICD-10-CM

## 2021-06-08 RX ORDER — GABAPENTIN 300 MG/1
300 CAPSULE ORAL 4 TIMES DAILY
Qty: 120 CAPSULE | Refills: 0 | Status: SHIPPED | OUTPATIENT
Start: 2021-06-08

## 2021-06-08 NOTE — TELEPHONE ENCOUNTER
Sent the patient a [employer] message to make an appt for further medication refills.      Medication: GABAPENTIN 300 MG Oral Cap    Date of last refill: 07/28/2020 (#180/1)  [place]    Last office visit: 11/04/2020  Due

## 2021-06-13 ENCOUNTER — HOSPITAL ENCOUNTER (EMERGENCY)
Age: 61
Discharge: HOME | End: 2021-06-14
Attending: EMERGENCY MEDICINE
Payer: COMMERCIAL

## 2021-06-13 DIAGNOSIS — M71.21 BAKER'S CYST OF KNEE, RIGHT: Primary | ICD-10-CM

## 2021-06-13 PROCEDURE — 99284 EMERGENCY DEPT VISIT MOD MDM: CPT

## 2021-06-14 ENCOUNTER — APPOINTMENT (OUTPATIENT)
Dept: ULTRASOUND IMAGING | Age: 61
End: 2021-06-14
Payer: COMMERCIAL

## 2021-06-14 VITALS
WEIGHT: 132 LBS | TEMPERATURE: 99 F | OXYGEN SATURATION: 99 % | HEART RATE: 75 BPM | HEIGHT: 62 IN | SYSTOLIC BLOOD PRESSURE: 122 MMHG | RESPIRATION RATE: 16 BRPM | DIASTOLIC BLOOD PRESSURE: 64 MMHG | BODY MASS INDEX: 24.29 KG/M2

## 2021-06-14 PROCEDURE — 93971 EXTREMITY STUDY: CPT | Performed by: EMERGENCY MEDICINE

## 2021-06-14 NOTE — ED PROVIDER NOTES
Patient Seen in: THE [facility] Emergency Department In [place]      History   Patient presents with:  Deep Vein Thrombosis    Stated Complaint: rt leg pain     HPI/Subjective:   HPI    Patient is a 19-year-old female who states that since yesterday morning s Review of Systems    Positive for stated complaint: rt leg pain   Other systems are as noted in HPI. Constitutional and vital signs reviewed. All other systems reviewed and negative except as noted above.     Physical Exam     ED Triage Vitals [

## 2021-07-10 ENCOUNTER — HOSPITAL ENCOUNTER (EMERGENCY)
Age: 61
Discharge: HOME | End: 2021-07-10
Attending: EMERGENCY MEDICINE
Payer: COMMERCIAL

## 2021-07-10 VITALS
OXYGEN SATURATION: 100 % | DIASTOLIC BLOOD PRESSURE: 100 MMHG | HEART RATE: 90 BPM | RESPIRATION RATE: 18 BRPM | TEMPERATURE: 99 F | SYSTOLIC BLOOD PRESSURE: 163 MMHG | HEIGHT: 62 IN | WEIGHT: 130 LBS | BODY MASS INDEX: 23.92 KG/M2

## 2021-07-10 DIAGNOSIS — S61.012A LACERATION OF LEFT THUMB WITHOUT FOREIGN BODY WITHOUT DAMAGE TO NAIL, INITIAL ENCOUNTER: Primary | ICD-10-CM

## 2021-07-10 PROCEDURE — 90471 IMMUNIZATION ADMIN: CPT

## 2021-07-10 PROCEDURE — 99283 EMERGENCY DEPT VISIT LOW MDM: CPT

## 2021-07-10 PROCEDURE — 12001 RPR S/N/AX/GEN/TRNK 2.5CM/<: CPT

## 2021-07-10 RX ORDER — LEVOFLOXACIN 500 MG/1
500 TABLET, FILM COATED ORAL DAILY
COMMUNITY

## 2021-07-11 NOTE — ED PROVIDER NOTES
Patient Seen in: THE [facility] Emergency Department In [place]      History   Patient presents with:  Laceration    Stated Complaint: L thumb laceration     HPI/Subjective:   HPI    Patient is a 57-year-old right-hand-dominant female presents emergency room No pertinent social history. Review of Systems    Positive for stated complaint: L thumb laceration   Other systems are as noted in HPI. Constitutional and vital signs reviewed.       All other systems reviewed and negative except it is very superficial.  The patient will be discharged to home at this time. She has a total of 3 sutures which will need removal in 10 days. The patient was given a tetanus update here in the ER.   Patient was instructed return to the ER immediately if

## 2021-09-01 DIAGNOSIS — I82.441 ACUTE DVT OF RIGHT TIBIAL VEIN (HCC): ICD-10-CM

## 2021-09-01 RX ORDER — APIXABAN 5 MG/1
TABLET, FILM COATED ORAL
Qty: 60 TABLET | Refills: 2 | Status: SHIPPED | OUTPATIENT
Start: 2021-09-01 | End: 2021-11-10

## 2021-10-08 PROBLEM — M79.661 TENDERNESS OF RIGHT CALF: Status: ACTIVE | Noted: 2021-10-08

## 2021-10-08 PROBLEM — R22.41: Status: ACTIVE | Noted: 2021-10-08

## 2021-11-10 DIAGNOSIS — I82.441 ACUTE DVT OF RIGHT TIBIAL VEIN (HCC): ICD-10-CM

## 2021-11-10 RX ORDER — APIXABAN 5 MG/1
TABLET, FILM COATED ORAL
Qty: 60 TABLET | Refills: 0 | Status: SHIPPED | OUTPATIENT
Start: 2021-11-10 | End: 2021-12-20

## 2021-12-02 ENCOUNTER — HOSPITAL ENCOUNTER (OUTPATIENT)
Dept: GENERAL RADIOLOGY | Age: 61
Discharge: HOME | End: 2021-12-02
Attending: INTERNAL MEDICINE
Payer: COMMERCIAL

## 2021-12-02 DIAGNOSIS — M25.571 RIGHT ANKLE PAIN: ICD-10-CM

## 2021-12-02 PROCEDURE — 73630 X-RAY EXAM OF FOOT: CPT | Performed by: INTERNAL MEDICINE

## 2021-12-02 PROCEDURE — 73610 X-RAY EXAM OF ANKLE: CPT | Performed by: INTERNAL MEDICINE

## 2021-12-20 ENCOUNTER — TELEPHONE (OUTPATIENT)
Dept: HEMATOLOGY/ONCOLOGY | Age: 61
End: 2021-12-20

## 2021-12-20 DIAGNOSIS — I82.441 ACUTE DVT OF RIGHT TIBIAL VEIN (HCC): ICD-10-CM

## 2021-12-20 NOTE — TELEPHONE ENCOUNTER
[facility] to schedule a F/U with Dr. Tamiko Owens per Lee [facility] staff message, Call # 1, 12/20/21.

## 2021-12-21 ENCOUNTER — TELEPHONE (OUTPATIENT)
Dept: HEMATOLOGY/ONCOLOGY | Facility: HOSPITAL | Age: 61
End: 2021-12-21

## 2021-12-21 NOTE — TELEPHONE ENCOUNTER
Patient returning call regarding a F/U visit with Dr Mike Fiore.     PT stated [place] is going [place] the [place], and will call and schedule the F/U when she returns

## 2021-12-23 ENCOUNTER — LAB ENCOUNTER (OUTPATIENT)
Dept: LAB | Facility: HOSPITAL | Age: 61
End: 2021-12-23
Attending: INTERNAL MEDICINE
Payer: COMMERCIAL

## 2021-12-23 DIAGNOSIS — Z71.84 COUNSELING FOR TRAVEL: ICD-10-CM

## 2022-08-12 ENCOUNTER — APPOINTMENT (OUTPATIENT)
Dept: GENERAL RADIOLOGY | Age: 62
End: 2022-08-12
Attending: EMERGENCY MEDICINE
Payer: COMMERCIAL

## 2022-08-12 ENCOUNTER — HOSPITAL ENCOUNTER (EMERGENCY)
Age: 62
Discharge: HOME | End: 2022-08-12
Attending: EMERGENCY MEDICINE
Payer: COMMERCIAL

## 2022-08-12 VITALS
DIASTOLIC BLOOD PRESSURE: 76 MMHG | WEIGHT: 124 LBS | BODY MASS INDEX: 22.82 KG/M2 | TEMPERATURE: 98 F | SYSTOLIC BLOOD PRESSURE: 153 MMHG | RESPIRATION RATE: 18 BRPM | HEART RATE: 85 BPM | HEIGHT: 62 IN | OXYGEN SATURATION: 100 %

## 2022-08-12 DIAGNOSIS — S83.92XA SPRAIN OF LEFT KNEE, UNSPECIFIED LIGAMENT, INITIAL ENCOUNTER: ICD-10-CM

## 2022-08-12 DIAGNOSIS — R07.89 CHEST WALL PAIN: Primary | ICD-10-CM

## 2022-08-12 PROCEDURE — 99284 EMERGENCY DEPT VISIT MOD MDM: CPT | Performed by: EMERGENCY MEDICINE

## 2022-08-12 PROCEDURE — 73562 X-RAY EXAM OF KNEE 3: CPT | Performed by: EMERGENCY MEDICINE

## 2022-08-12 PROCEDURE — 71101 X-RAY EXAM UNILAT RIBS/CHEST: CPT | Performed by: EMERGENCY MEDICINE
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
## 2023-10-10 ENCOUNTER — EXTERNAL LAB (OUTPATIENT)
Dept: HEALTH INFORMATION MANAGEMENT | Facility: OTHER | Age: 63
End: 2023-10-10

## 2023-10-10 LAB — LAB RESULT: NORMAL

## 2023-10-12 ENCOUNTER — EXTERNAL RECORD (OUTPATIENT)
Dept: HEALTH INFORMATION MANAGEMENT | Facility: OTHER | Age: 63
End: 2023-10-12

## 2023-12-05 ENCOUNTER — APPOINTMENT (OUTPATIENT)
Dept: CARDIOLOGY | Age: 63
End: 2023-12-05

## 2023-12-05 VITALS
BODY MASS INDEX: 25.44 KG/M2 | WEIGHT: 138.23 LBS | DIASTOLIC BLOOD PRESSURE: 78 MMHG | SYSTOLIC BLOOD PRESSURE: 131 MMHG | HEIGHT: 62 IN | HEART RATE: 71 BPM

## 2023-12-05 DIAGNOSIS — I10 ESSENTIAL HYPERTENSION: Primary | ICD-10-CM

## 2023-12-05 DIAGNOSIS — I82.401 ACUTE DEEP VEIN THROMBOSIS (DVT) OF RIGHT LOWER EXTREMITY, UNSPECIFIED VEIN (CMD): ICD-10-CM

## 2023-12-05 DIAGNOSIS — G47.33 OBSTRUCTIVE SLEEP APNEA SYNDROME: ICD-10-CM

## 2023-12-05 DIAGNOSIS — E78.00 HYPERCHOLESTEROLEMIA: ICD-10-CM

## 2023-12-05 PROCEDURE — 3075F SYST BP GE 130 - 139MM HG: CPT | Performed by: INTERNAL MEDICINE

## 2023-12-05 PROCEDURE — 3078F DIAST BP <80 MM HG: CPT | Performed by: INTERNAL MEDICINE

## 2023-12-05 PROCEDURE — 99204 OFFICE O/P NEW MOD 45 MIN: CPT | Performed by: INTERNAL MEDICINE

## 2023-12-05 RX ORDER — ATORVASTATIN CALCIUM 10 MG/1
10 TABLET, FILM COATED ORAL AT BEDTIME
Qty: 90 TABLET | Refills: 3 | Status: SHIPPED | OUTPATIENT
Start: 2023-12-05

## 2023-12-05 RX ORDER — LOSARTAN POTASSIUM 100 MG/1
100 TABLET ORAL DAILY
Qty: 90 TABLET | Refills: 3 | Status: SHIPPED | OUTPATIENT
Start: 2023-12-05

## 2023-12-05 SDOH — HEALTH STABILITY: PHYSICAL HEALTH: ON AVERAGE, HOW MANY MINUTES DO YOU ENGAGE IN EXERCISE AT THIS LEVEL?: 60 MIN

## 2023-12-05 SDOH — HEALTH STABILITY: PHYSICAL HEALTH: ON AVERAGE, HOW MANY DAYS PER WEEK DO YOU ENGAGE IN MODERATE TO STRENUOUS EXERCISE (LIKE A BRISK WALK)?: 3 DAYS

## 2023-12-05 ASSESSMENT — PATIENT HEALTH QUESTIONNAIRE - PHQ9
2. FEELING DOWN, DEPRESSED OR HOPELESS: NOT AT ALL
1. LITTLE INTEREST OR PLEASURE IN DOING THINGS: NOT AT ALL
SUM OF ALL RESPONSES TO PHQ9 QUESTIONS 1 AND 2: 0
SUM OF ALL RESPONSES TO PHQ9 QUESTIONS 1 AND 2: 0
CLINICAL INTERPRETATION OF PHQ2 SCORE: NO FURTHER SCREENING NEEDED

## 2024-09-16 ENCOUNTER — EXTERNAL LAB (OUTPATIENT)
Dept: HEALTH INFORMATION MANAGEMENT | Facility: OTHER | Age: 64
End: 2024-09-16

## 2024-09-16 LAB
ALBUMIN SERPL-MCNC: 4 G/DL (ref 3.6–5.1)
ALBUMIN/GLOB SERPL: 1.5 (CALC) (ref 1–2.5)
ALP SERPL-CCNC: 73 U/L (ref 37–153)
ALT SERPL-CCNC: 16 U/L (ref 6–29)
AST SERPL-CCNC: 17 U/L (ref 10–35)
BILIRUB SERPL-MCNC: 0.4 MG/DL (ref 0.2–1.2)
BUN SERPL-MCNC: 25 MG/DL (ref 7–25)
BUN/CREAT SERPL: NORMAL (CALC) (ref 6–22)
CALCIUM SERPL-MCNC: 9 MG/DL (ref 8.6–10.4)
CHLORIDE SERPL-SCNC: 106 MMOL/L (ref 98–110)
CHOLEST SERPL-MCNC: 244 MG/DL
CHOLEST/HDLC SERPL: 2.9 (CALC)
CO2 SERPL-SCNC: 26 MMOL/L (ref 20–32)
CREAT SERPL-MCNC: 0.88 MG/DL (ref 0.5–1.05)
GFR SERPLBLD SCHWARTZ-ARVRAT: 73 ML/MIN/1.73M2
GLOBULIN SER-MCNC: 2.7 G/DL (CALC) (ref 1.9–3.7)
GLUCOSE SERPL-MCNC: 83 MG/DL (ref 65–99)
HBA1C MFR BLD: 5.8 % OF TOTAL HGB
HDLC SERPL-MCNC: 85 MG/DL
LAB RESULT: NORMAL
LDLC SERPL CALC-MCNC: 137 MG/DL (CALC)
LENGTH OF FAST TIME PATIENT: ABNORMAL H
LENGTH OF FAST TIME PATIENT: NORMAL H
NONHDLC SERPL-MCNC: 159 MG/DL (CALC)
POTASSIUM SERPL-SCNC: 3.9 MMOL/L (ref 3.5–5.3)
PROT SERPL-MCNC: 6.7 G/DL (ref 6.1–8.1)
SODIUM SERPL-SCNC: 141 MMOL/L (ref 135–146)
TRIGL SERPL-MCNC: 109 MG/DL

## 2024-10-08 ENCOUNTER — APPOINTMENT (OUTPATIENT)
Dept: CARDIOLOGY | Age: 64
End: 2024-10-08

## 2024-10-08 ENCOUNTER — E-ADVICE (OUTPATIENT)
Dept: CARDIOLOGY | Age: 64
End: 2024-10-08

## 2024-10-08 VITALS
WEIGHT: 149.14 LBS | OXYGEN SATURATION: 98 % | BODY MASS INDEX: 27.45 KG/M2 | DIASTOLIC BLOOD PRESSURE: 84 MMHG | SYSTOLIC BLOOD PRESSURE: 134 MMHG | HEART RATE: 62 BPM | HEIGHT: 62 IN

## 2024-10-08 DIAGNOSIS — E78.00 HYPERCHOLESTEROLEMIA: ICD-10-CM

## 2024-10-08 DIAGNOSIS — I82.401 ACUTE DEEP VEIN THROMBOSIS (DVT) OF RIGHT LOWER EXTREMITY, UNSPECIFIED VEIN  (CMD): ICD-10-CM

## 2024-10-08 DIAGNOSIS — G47.33 OBSTRUCTIVE SLEEP APNEA SYNDROME: ICD-10-CM

## 2024-10-08 DIAGNOSIS — I10 ESSENTIAL HYPERTENSION: Primary | ICD-10-CM

## 2024-10-08 RX ORDER — TRIAMTERENE/HYDROCHLOROTHIAZID 37.5-25 MG
1 TABLET ORAL EVERY MORNING
COMMUNITY
Start: 2024-09-08 | End: 2024-10-08

## 2024-10-08 RX ORDER — ATORVASTATIN CALCIUM 20 MG/1
20 TABLET, FILM COATED ORAL DAILY
COMMUNITY
End: 2024-10-08 | Stop reason: SDUPTHER

## 2024-10-08 RX ORDER — ATORVASTATIN CALCIUM 20 MG/1
20 TABLET, FILM COATED ORAL DAILY
Qty: 90 TABLET | Refills: 3 | Status: SHIPPED | OUTPATIENT
Start: 2024-10-08

## 2024-10-08 RX ORDER — GABAPENTIN 100 MG/1
300 CAPSULE ORAL 2 TIMES DAILY
COMMUNITY
Start: 2024-09-08

## 2024-10-08 ASSESSMENT — PATIENT HEALTH QUESTIONNAIRE - PHQ9
SUM OF ALL RESPONSES TO PHQ9 QUESTIONS 1 AND 2: 0
2. FEELING DOWN, DEPRESSED OR HOPELESS: NOT AT ALL
CLINICAL INTERPRETATION OF PHQ2 SCORE: NO FURTHER SCREENING NEEDED
SUM OF ALL RESPONSES TO PHQ9 QUESTIONS 1 AND 2: 0
1. LITTLE INTEREST OR PLEASURE IN DOING THINGS: NOT AT ALL

## 2024-10-11 LAB
ATRIAL RATE (BPM): 63
P AXIS (DEGREES): 41
PR-INTERVAL (MSEC): 152
QRS-INTERVAL (MSEC): 92
QT-INTERVAL (MSEC): 422
QTC: 432
R AXIS (DEGREES): -45
REPORT TEXT: NORMAL
T AXIS (DEGREES): -6
VENTRICULAR RATE EKG/MIN (BPM): 63

## 2024-10-16 ENCOUNTER — HOSPITAL ENCOUNTER (OUTPATIENT)
Age: 64
Discharge: HOME | End: 2024-10-16
Payer: COMMERCIAL

## 2024-10-16 VITALS
SYSTOLIC BLOOD PRESSURE: 116 MMHG | OXYGEN SATURATION: 100 % | DIASTOLIC BLOOD PRESSURE: 67 MMHG | HEART RATE: 82 BPM | RESPIRATION RATE: 20 BRPM | TEMPERATURE: 98 F

## 2024-10-16 DIAGNOSIS — S61.209A AVULSION OF FINGER TIP, INITIAL ENCOUNTER: Primary | ICD-10-CM

## 2024-10-16 PROCEDURE — A9150 MISC/EXPER NON-PRESCRIPT DRU: HCPCS | Performed by: PHYSICIAN ASSISTANT

## 2024-10-16 PROCEDURE — 99203 OFFICE O/P NEW LOW 30 MIN: CPT | Performed by: PHYSICIAN ASSISTANT

## 2024-10-16 RX ORDER — ACETAMINOPHEN 500 MG
1000 TABLET ORAL ONCE
Status: COMPLETED | OUTPATIENT
Start: 2024-10-16 | End: 2024-10-16

## 2024-10-16 NOTE — ED PROVIDER NOTES
Patient Seen in: [facility]      History   No chief complaint on file.    Stated Complaint: L Hand FInger Cut    Subjective:   HPI      Patient is a 64-year-old female with past medical history of DVT currently on Eliquis that presents due to left index finger laceration that she sustained prior to arrival.  Patient states that she was cutting meat with an industrial blade and excellently cut her finger.  Patient has been applying pressure with minimal relief.  Denies limited range of motion of finger paresthesias.  Patient received tetanus shot a few months prior.    Objective:     Past Medical History:    Asthma (HCC)    BPPV (benign paroxysmal positional vertigo)    DVT (deep venous thrombosis) (HCC)    Extrinsic asthma, unspecified    Glucose intolerance (pre-diabetes)    during pregnancy    H/O mammogram    negative    High cholesterol    HYPERTENSION    Osteoarthrosis, unspecified whether generalized or localized, unspecified site    Pap smear for cervical cancer screening    wnl    Unspecified essential hypertension    Visual impairment    glasses              Past Surgical History:   Procedure Laterality Date    Knee replacement surgery Bilateral 1/2015    TKA    Knee replacement surgery      Mri ankle/hindfoot, right (cpt=73721) Right 05/2019    Other surgical history      ORIF right ankle 1998, DEREK right [id number]    Other surgical history Bilateral     arthroscopy    Other surgical history Right 7/22/2016    Procedure: ELBOW TENNIS RELEASE;  Surgeon: Durga Edmond MD;  Location:  MAIN OR    Total ankle replacement Right 05/24/2019    Dr. Edmond    Total knee replacement      Tubal ligation  1995                Social History     Socioeconomic History    Marital status:    Tobacco Use    Smoking status: Never    Smokeless tobacco: Never   Vaping Use    Vaping status: Never Used   Substance and Sexual Activity    Alcohol use: No    Drug use: No   Other Topics Concern    Caffeine  Concern Yes     Comment: 1 cup coffee/day    Exercise Yes     Social Drivers of Health     Physical Activity: Low Risk  (12/5/2023)    Received from [facility]    Exercise Vital Sign     On average, how many days per week do you engage in moderate to strenuous exercise (like a brisk walk)?: 3 days     On average, how many minutes do you engage in exercise at this level?: 60 min    Received from [facility]    Social Connections    Received from [facility]    Housing Stability              Review of Systems    Positive for stated complaint: L Hand FInger Cut  Other systems are as noted in HPI.  Constitutional and vital signs reviewed.      All other systems reviewed and negative except as noted above.    Physical Exam     ED Triage Vitals [10/16/24 1326]   /67   Pulse 82   Resp 20   Temp 97.6 °F (36.4 °C)   Temp src Temporal   SpO2 100 %   O2 Device None (Room air)       Current Vitals:   Vital Signs  BP: 116/67  Pulse: 82  Resp: 20  Temp: 97.6 °F (36.4 °C)  Temp src: Temporal    Oxygen Therapy  SpO2: 100 %  O2 Device: None (Room air)        Physical Exam  Vital signs reviewed. Nursing note reviewed.  Constitutional: Well-developed. Well-nourished. In no acute distress  HENT: Mucous membranes moist.   EYES: No scleral icterus or conjunctival injection.  NECK: Full ROM. Supple.   CARDIAC: Normal rate. Normal S1/ S2. 2+ distal pulses. No edema  PULM/CHEST: Clear to auscultation bilaterally. No wheezes  Extremities: Full ROM left finger and hand.  2 cm avulsion of the lateral aspect of the left index finger along the distal phalanx.  Active bleeding.  Full flexion extension of left fingers.  No nail or nailbed involvement.  NEURO: Awake, alert, following commands, moving extremities, answering questions.   SKIN: Warm and dry. No rash or lesions.  PSYCH: Normal judgment. Normal affect.             MDM      Patient is a 64-year-old female with past medical history  of DVT on Eliquis that presents to immediate care due to finger laceration that she sustained prior to arrival.  Patient arrives with stable vitals.  Physical exam showing a 2x1 cm skin avulsion of the lateral aspect of the distal phalanx of the left index finger.  Unlikely underlying finger fracture, tendon injury as patient has full range of motion and strength of left finger and hand.  Surgifoam applied to wound.  Hemostasis achieved.  Nonadhesive dressing and Coban applied to finger prior to discharge.  Discussed return protocols including signs of infection including increasing pain, edema erythema.  History given by patient.        Medical Decision Making      Disposition and Plan     Clinical Impression:  1. Avulsion of finger tip, initial encounter         Disposition:  Discharge  10/16/2024  2:03 pm    Follow-up:  Rian Huddleston MD  [address] [address] [address] [place] [address]  [phone]    Call             Medications Prescribed:  Discharge Medication List as of 10/16/2024  2:05 PM              Supplementary Documentation:

## 2024-11-17 ENCOUNTER — HOSPITAL ENCOUNTER (EMERGENCY)
Age: 64
Discharge: HOME | End: 2024-11-17
Attending: EMERGENCY MEDICINE
Payer: COMMERCIAL

## 2024-11-17 ENCOUNTER — APPOINTMENT (OUTPATIENT)
Dept: ULTRASOUND IMAGING | Age: 64
End: 2024-11-17
Attending: EMERGENCY MEDICINE
Payer: COMMERCIAL

## 2024-11-17 VITALS
WEIGHT: 150 LBS | DIASTOLIC BLOOD PRESSURE: 63 MMHG | HEART RATE: 65 BPM | SYSTOLIC BLOOD PRESSURE: 121 MMHG | OXYGEN SATURATION: 100 % | TEMPERATURE: 98 F | HEIGHT: 62 IN | BODY MASS INDEX: 27.6 KG/M2 | RESPIRATION RATE: 16 BRPM

## 2024-11-17 DIAGNOSIS — M71.21 BAKER'S CYST OF KNEE, RIGHT: Primary | ICD-10-CM

## 2024-11-17 PROCEDURE — 93971 EXTREMITY STUDY: CPT | Performed by: EMERGENCY MEDICINE

## 2024-11-17 PROCEDURE — 99284 EMERGENCY DEPT VISIT MOD MDM: CPT

## 2024-11-17 RX ORDER — CIPROFLOXACIN 500 MG/1
500 TABLET, FILM COATED ORAL 2 TIMES DAILY
COMMUNITY

## 2024-11-18 NOTE — ED PROVIDER NOTES
Patient Seen in: [facility] In [place]      History     Chief Complaint   Patient presents with    Swelling Edema     Stated Complaint: c/o right throbing leg pain, swelling, warm to the touch,  hx of dvt took tylen*    Subjective:   HPI       64-year-old female presented with swelling and edema in her right leg, specifically behind the knee. The onset of these symptoms was reported to have started earlier in the day. The patient has a history of a blood clot, but is currently on blood thinners which she has been taking as prescribed. She denied any recent strain or injury to the leg. The patient reported that the pain worsens when going downstairs. The patient confirmed that the pain was localized to this area and not in the calf. The patient has a history of a Baker cyst in the same location and a knee replacement. However, since the patient has a knee replacement, she does not have a meniscus. The fluid can leak and collect in the back of the knee, causing swelling.    Objective:     Past Medical History:    Asthma (HCC)    BPPV (benign paroxysmal positional vertigo)    DVT (deep venous thrombosis) (HCC)    Extrinsic asthma, unspecified    Glucose intolerance (pre-diabetes)    during pregnancy    H/O mammogram    negative    High cholesterol    HYPERTENSION    Osteoarthrosis, unspecified whether generalized or localized, unspecified site    Pap smear for cervical cancer screening    wnl    Unspecified essential hypertension    Visual impairment    glasses              Past Surgical History:   Procedure Laterality Date    Knee replacement surgery Bilateral 1/2015    TKA    Knee replacement surgery      Mri ankle/hindfoot, right (cpt=73721) Right 05/2019    Other surgical history      ORIF right ankle 1998, DEREK right [id number]    Other surgical history Bilateral     arthroscopy    Other surgical history Right 7/22/2016    Procedure: ELBOW TENNIS RELEASE;  Surgeon: Durga Edmond MD;   Location:  MAIN OR    Total ankle replacement Right 05/24/2019    Dr. Edmond    Total knee replacement      Tubal ligation  1995                Social History     Socioeconomic History    Marital status:    Tobacco Use    Smoking status: Never    Smokeless tobacco: Never   Vaping Use    Vaping status: Never Used   Substance and Sexual Activity    Alcohol use: No    Drug use: No   Other Topics Concern    Caffeine Concern Yes     Comment: 1 cup coffee/day    Exercise Yes     Social Drivers of Health     Physical Activity: Low Risk  (12/5/2023)    Received from [facility]    Exercise Vital Sign     On average, how many days per week do you engage in moderate to strenuous exercise (like a brisk walk)?: 3 days     On average, how many minutes do you engage in exercise at this level?: 60 min    Received from [facility]    Social Connections    Received from [facility]    Housing Stability                  Physical Exam     ED Triage Vitals [11/17/24 2220]   /60   Pulse 65   Resp 15   Temp 98.4 °F (36.9 °C)   Temp src Oral   SpO2 99 %   O2 Device None (Room air)       Current Vitals:   Vital Signs  BP: 120/60  Pulse: 65  Resp: 15  Temp: 98.4 °F (36.9 °C)  Temp src: Oral    Oxygen Therapy  SpO2: 99 %  O2 Device: None (Room air)        Physical Exam    Vital signs reviewed  General appearance: Patient is alert and in no acute distress  HEENT: Pupils equal react to light extraocular muscles intact no scleral icterus, mucous membranes are moist, there is no erythema or exudate in the posterior pharynx  Neck: Supple no JVD no lymphadenopathy no meningismus no carotid bruit  CV: Regular rate and rhythm no murmur rub  Respiratory: Clear to auscultation bilaterally no crackles no wheezes no accessory muscle use  Abdomen: Soft nontender nondistended, no rebound no guarding  no hepatosplenomegaly bowel sounds are present , no pulsatile mass  Extremities: No clubbing  cyanosis or edema 2+ distal pulses.  Right knee has some swelling and tenderness in the popliteal fossa.  Neuro: Cranial nerves II through XII intact with no gross focal sensory or motor abnormality.      ED Course   Labs Reviewed - No data to display         Patient was evaluated the fact that she is on anticoagulants I doubt very much it is a DVT most likely a Baker's cyst but will get an ultrasound to confirm.    US VENOUS DOPPLER LEG RIGHT - DIAG IMG (CPT=93971)    Result Date: 11/17/2024  CONCLUSION:  No evidence of lower extremity DVT.  Right Baker's cyst.   LOCATION:  [facility]    Dictated by (CST): Momo Staples MD on 11/17/2024 at 11:33 PM     Finalized by (CST): Momo Staples MD on 11/17/2024 at 11:34 PM          Ultrasound does show a Baker's cyst and no DVT.  Patient will give instructions on this follow-up with orthopedics.  Tylenol and icing for pain also may want a wear a sleeve on that knee is sometimes that helps       MDM      Differential diagnosis reflecting the complexity of care include: Baker's cyst, DVT, knee strain    Comorbidities that add complexity to management include: History of DVT and currently anticoagulated      My independent interpretation of studies of: Ultrasound does show a fluid collection behind the knee in the popliteal fossa consistent with a Baker's cyst veins are compressible no DVT      Shared decision making was done by myself and patient.  Patient be discharged home and do supportive care follow-up orthopedics.  Tylenol for pain return if worse    Patient was screened and evaluated during this visit.  As the treating physician attending to the patient, I determined within reasonable clinical confidence and prior to discharge, that an emergency medical condition was not or was no longer present.  There was no indication for further evaluation, treatment, or admission on an emergency basis.  Comprehensive verbal and written discharge and follow-up instructions were provided to  help prevent relapse or worsening.  Patient was instructed to follow-up with primary care provider for further evaluation treatment, return immediately to ER for worsening, concerning, new, or changing/persisting symptoms.  I discussed the case with the patient and they had no questions, complaints, or concerns.  Patient was comfortable going home.      Dictation Disclaimer Note:   To increase efficiency this document may have been prepared using voice recognition technology. Every effort has been made to correct any errors made during preparation of this note. However, if a word or phrase is confusing, or does not make sense, this is likely due to a recognition error within the program which was not discovered during editing. Please do not hesitate to contact to address any significant errors.    Note to Patient:   The 21st Century Cures Act makes medical notes like these available to patients in the interest of transparency. Please be advised this is a medical document. Medical documents are intended to carry relevant information, facts as evident, and the clinical opinion of the practitioner. The medical note is intended as peer to peer communication and may appear blunt or direct. It is written in medical language and may contain abbreviations or verbiage that are unfamiliar.               MDM    Disposition and Plan     Clinical Impression:  1. Baker's cyst of knee, right         Disposition:  Discharge  11/17/2024 11:37 pm    Follow-up:  Tonio De Souza MD  [address]   [address]  [facility] [address]  [phone]    Follow up            Medications Prescribed:  Current Discharge Medication List              Supplementary Documentation:

## 2024-11-18 NOTE — ED INITIAL ASSESSMENT (HPI)
R popliteal pain that began today. Hx of dvt in area. Pt on eliquis. Mild swelling to leg as compared to left leg.

## 2024-12-13 RX ORDER — APIXABAN 5 MG/1
5 TABLET, FILM COATED ORAL EVERY 12 HOURS
Qty: 180 TABLET | Refills: 3 | Status: SHIPPED | OUTPATIENT
Start: 2024-12-13

## 2024-12-16 RX ORDER — ATORVASTATIN CALCIUM 20 MG/1
20 TABLET, FILM COATED ORAL DAILY
Qty: 180 TABLET | Refills: 1 | Status: SHIPPED | OUTPATIENT
Start: 2024-12-16

## 2025-06-17 ENCOUNTER — APPOINTMENT (OUTPATIENT)
Dept: CARDIOLOGY | Age: 65
End: 2025-06-17

## (undated) DEVICE — STERILE HOOK LOCK LATEX FREE ELASTIC BANDAGE 6INX5YD: Brand: HOOK LOCK™

## (undated) DEVICE — C-ARMOR C-ARM EQUIPMENT COVERS CLEAR STERILE UNIVERSAL FIT 12 PER CASE: Brand: C-ARMOR

## (undated) DEVICE — SUTURE VICRYL 0 CP-1

## (undated) DEVICE — SUTURE ETHILON 3-0 PS-2

## (undated) DEVICE — Device

## (undated) DEVICE — CHLORAPREP 26ML APPLICATOR

## (undated) DEVICE — DRAPE C-ARM UNIVERSAL

## (undated) DEVICE — IMPLANTABLE DEVICE: Type: IMPLANTABLE DEVICE | Site: ANKLE

## (undated) DEVICE — Device: Brand: STABLECUT®

## (undated) DEVICE — DRILL BIT ZIM 2.0 2360-175-20

## (undated) DEVICE — GAMMEX® PI HYBRID SIZE 8.5, STERILE POWDER-FREE SURGICAL GLOVE, POLYISOPRENE AND NEOPRENE BLEND: Brand: GAMMEX

## (undated) DEVICE — DRILL BIT ZIM 2.5 4806-110-25

## (undated) DEVICE — PRECISION (12.0 X 0.51 X 34.5MM)

## (undated) DEVICE — SUTURE VICRYL 2-0 CP-1

## (undated) DEVICE — STERILE POLYISOPRENE POWDER-FREE SURGICAL GLOVES: Brand: PROTEXIS

## (undated) DEVICE — SOL  .9 1000ML BTL

## (undated) DEVICE — DRAPE TABLE COVER 44X90 TC-10

## (undated) DEVICE — GAUZE SPONGES,12 PLY: Brand: CURITY

## (undated) DEVICE — SPLINT PRECUT SYNTH 4X30

## (undated) DEVICE — SUTURE ETHILON 5-0 PS-3

## (undated) DEVICE — CONVERTORS STOCKINETTE: Brand: CONVERTORS

## (undated) DEVICE — TOTAL KNEE CDS: Brand: MEDLINE INDUSTRIES, INC.

## (undated) DEVICE — DRILL BIT ZIM 2.7 2360-205-27

## (undated) DEVICE — 4.0MM OVAL CARBIDE BUR

## (undated) DEVICE — TOWEL OR BLU 16X26 STRL

## (undated) DEVICE — KENDALL SCD EXPRESS SLEEVES, KNEE LENGTH, MEDIUM: Brand: KENDALL SCD

## (undated) DEVICE — OCCLUSIVE GAUZE STRIP OVERWRAP,3% BISMUTH TRIBROMOPHENATE IN PETROLATUM BLEND: Brand: XEROFORM

## (undated) NOTE — LETTER
[facility]  Phone: [phone]  Right Fax: [phone]  [facility] 20 By:  Irma Hunt RN Date: 19    Patient Name: Nikki Pop  Surgery Date: 2019    CSN: [id number]  Medical Record: [id number]   :

## (undated) NOTE — ED AVS SNAPSHOT
Verna Calvert   MRN: [id number]    Department:  [facility] Emergency Department in [place]   Date of Visit:  6/5/2018           Disclosure     Insurance plans vary and the physician(s) referred by the ER may not be covered by your plan.  Please contact tell this physician (or your personal doctor if your instructions are to return to your personal doctor) about any new or lasting problems. The primary care or specialist physician will see patients referred from the [facility] Emergency Department.  Haylee Messina

## (undated) NOTE — ED AVS SNAPSHOT
Gladys Woods   MRN: [id number]    Department:  [facility] Emergency Department   Date of Visit:  1/11/2018           Disclosure     Insurance plans vary and the physician(s) referred by the ER may not be covered by your plan.  Please contact your tell this physician (or your personal doctor if your instructions are to return to your personal doctor) about any new or lasting problems. The primary care or specialist physician will see patients referred from the [facility] Emergency Department.  Rebecca Kelsey

## (undated) NOTE — ED AVS SNAPSHOT
THE [facility] Emergency Department in [address] [facility]    Phone:  [phone]    Fax:  [phone]           Salvador Saucedo   MRN: [id number]    Department:  THE [facility] Emergency Department in [place]   Date of Visit: To Check ER Wait Times:  TEXT 'ERwait' to 32491      Click [email or web address]      Or call [phone]    If you have any problems with your follow-up, please call our  at [phone]    Si usted tiene algun problema con romero sequimiento, por f I have read and understand the instructions given to me by my caregivers. 24[facility]Hour Pharm[facility]        Pharmacy Address Phone Number   [address] [facility] ([address]) [address] [place] [address].  [facility] Dictated by: Fede Reynoso MD on 2/10/2017 at 9:10       Approved by: Fede Reynoso MD              Narrative:    PROCEDURE:  XR CHEST PA + LAT CHEST (CPT=71020)     INDICATIONS:  COUGH     COMPARISON:  [facility], CHEST PA   LATERAL, 9/02/2010, 23:43.

## (undated) NOTE — ED AVS SNAPSHOT
THE [facility] Emergency Department in [facility]    Phone:  [phone]    Fax:  [phone]           Quintin Ada   MRN: [id number]    Department:  THE [facility] Emergency Department in [place]   Date of Visit: IF THERE IS ANY CHANGE OR WORSENING OF YOUR CONDITION, CALL YOUR PRIMARY CARE PHYSICIAN AT ONCE OR RETURN IMMEDIATELY TO THE EMERGENCY DEPARTMENT.     If you have been prescribed any medication(s), please fill your prescription right away and begin taking t